# Patient Record
Sex: FEMALE | Race: WHITE | Employment: OTHER | URBAN - METROPOLITAN AREA
[De-identification: names, ages, dates, MRNs, and addresses within clinical notes are randomized per-mention and may not be internally consistent; named-entity substitution may affect disease eponyms.]

---

## 2017-01-16 ENCOUNTER — MYC MEDICAL ADVICE (OUTPATIENT)
Dept: FAMILY MEDICINE | Facility: CLINIC | Age: 57
End: 2017-01-16

## 2017-01-16 DIAGNOSIS — N95.0 POST-MENOPAUSE BLEEDING: Primary | ICD-10-CM

## 2017-01-16 NOTE — TELEPHONE ENCOUNTER
Information below is reviewed with  Dr Bessie Aponte, Verbal Orders, patient will need to have pelvic ultrasound to assess uterine lining and a endometrial biopsy, which Dr Bessie Aponte can do.  embx will need to be done after ultrasound.  Orders are placed. Left message on answering machine for patient/parent to call back.   [phone].  Jesenia Goldstein RN

## 2017-01-16 NOTE — TELEPHONE ENCOUNTER
Patient is given Verbal Orders below.  Patient states her Oncologist wanted her to only see a GYN, ?'d does she need a referral.  Patient is advise to contact her insurance company to determine if she needs a referral from Dr Bessie Aponte or Oncologist.  Who sets this up?  Advise if GYN through [place] via Oncologist they would set up.  Patient states she may want to do this through Dr Bessie Aponte.  Patient is going to contact insurance.  Contact Please call [facility] at [phone] to schedule an appointment and consider options and let care team know plan.    FYI and Per protocol, will route encounter to be cosigned by provider for Verbal Orders.  Jesenia Goldstein RN

## 2017-01-27 DIAGNOSIS — I10 HYPERTENSION GOAL BP (BLOOD PRESSURE) < 140/90: Primary | ICD-10-CM

## 2017-01-27 RX ORDER — LISINOPRIL 20 MG/1
20 TABLET ORAL DAILY
Qty: 90 TABLET | Refills: 2 | Status: SHIPPED | OUTPATIENT
Start: 2017-01-27 | End: 2017-11-04

## 2017-01-27 RX ORDER — HYDROCHLOROTHIAZIDE 25 MG/1
25 TABLET ORAL DAILY
Qty: 90 TABLET | Refills: 2 | Status: SHIPPED | OUTPATIENT
Start: 2017-01-27 | End: 2017-11-04

## 2017-01-27 NOTE — PROGRESS NOTES
SUBJECTIVE:                                                    Nathalie Melchor is a 56 year old female who presents to clinic today for the following health issues:    Abnormal menses recently had medication change to Tamoxifen , has had some spotting since medication recent pelvic US showed uterine fibroids as well as thickened endometrial stripe at 1.1 cm.  No cramping.    pt states that she is having clear vaginal discharge, no irritation or itch. Has had this in past without explanation.      Patient Active Problem List   Diagnosis     GERD (gastroesophageal reflux disease)     Iron deficiency anemia     Leiomyoma of uterus     CARDIOVASCULAR SCREENING; LDL GOAL LESS THAN 160     Osteopenia     Hypertension goal BP (blood pressure) < 140/90     Health maintenance examination     Choroidal nevus of left eye     Gastroesophageal reflux disease without esophagitis     Advanced directives, counseling/discussion     Malignant neoplasm of lower-inner quadrant of left female breast (H)     High risk medication use     Past Surgical History   Procedure Laterality Date     Endoscopy  04/03/2007     upper GI endoscopy-[facility]     Cystoscopy  04/08/1993     cystoscopy and urethral dilation     Mastectomy simple bilateral  7/2009     Armando Massectomy, breast cancer       Social History   Substance Use Topics     Smoking status: Never Smoker      Smokeless tobacco: Never Used      Comment: lives in smoke free household.     Alcohol Use: Yes      Comment: 1 or 2 a month     Family History   Problem Relation Age of Onset     Hypertension Mother      CANCER Mother      not melanoma     Allergies Father      HEART DISEASE Father 78     MI     Hypertension Father      Cancer - colorectal Maternal Grandfather 90     DIABETES No family hx of      CEREBROVASCULAR DISEASE No family hx of      Thyroid Disease No family hx of      Glaucoma No family hx of      Macular Degeneration No family hx of      OSTEOPOROSIS  "Mother          Current Outpatient Prescriptions   Medication Sig Dispense Refill     lisinopril (PRINIVIL/ZESTRIL) 20 MG tablet Take 1 tablet (20 mg) by mouth daily 90 tablet 2     hydrochlorothiazide (HYDRODIURIL) 25 MG tablet Take 1 tablet (25 mg) by mouth daily 90 tablet 2     tamoxifen (NOLVADEX) 20 MG tablet 20 mg       calcium carbonate (TUMS) 500 MG chewable tablet Take 2 chew tab by mouth 2 times daily       omeprazole (PRILOSEC) 20 MG capsule Take 1 capsule (20 mg) by mouth daily (Patient taking differently: Take 20 mg by mouth daily PRN) 90 capsule 3     MELATONIN PO        ASPIRIN NOT PRESCRIBED, INTENTIONAL, 1 each continuous prn Antiplatelet medication not prescribed intentionally due to not needed. 0 each 0     ferrous sulfate 325 (65 FE) MG tablet Take by mouth daily (with breakfast)       BP Readings from Last 3 Encounters:   02/01/17 134/84   10/06/16 128/80   05/10/16 129/81    Wt Readings from Last 3 Encounters:   02/01/17 135 lb (61.236 kg)   12/01/16 135 lb (61.236 kg)   10/06/16 134 lb (60.782 kg)                  Labs reviewed in EPIC  Problem list, Medication list, Allergies, and Medical/Social/Surgical histories reviewed in [facility] and updated as appropriate.    ROS:  Constitutional, HEENT, cardiovascular, pulmonary, gi and gu systems are negative, except as otherwise noted.    OBJECTIVE:                                                    /84 mmHg  Pulse 103  Temp(Src) 98  F (36.7  C) (Oral)  Ht 5' 4\" (1.626 m)  Wt 135 lb (61.236 kg)  BMI 23.16 kg/m2  Body mass index is 23.16 kg/(m^2).  GENERAL: healthy, alert and no distress  ABDOMEN: soft, nontender, no hepatosplenomegaly, no masses and bowel sounds normal   (female): normal female external genitalia, normal urethral meatus , vaginal mucosal atrophy and normal cervix, adnexae, and uterus without masses.  MS: no gross musculoskeletal defects noted, no edema  SKIN: no suspicious lesions or rashes  PSYCH: mentation appears normal, " affect normal/bright    Diagnostic Test Results:  Wet prep: neg     ASSESSMENT/PLAN:                                                    (N95.0) Postmenopausal bleeding  (primary encounter diagnosis)  Comment: postmenopausal bleeding in pt on tamoxifen  Plan: Surgical pathology exam        The procedure technique was explained to the patient,as well as possible risks of infection or bleeding. Patient gives her written consent to proceed.          Speculum was placed into the vaginal vault: Cervix was visualized and appears multiparous.  Cervix was prepped with betadine. A pipelle was easily introduced through the cervical os.  The uterus sounded to 6.5 cm.  Tenaculum was  used on the cervix. Endometrial sample was obtained.  There was very minimal bleeding from the cervical os after removal of the pipelle. Silver nitrate was not used to cauterize the tenaculum sites.  The patient tolerated the procedure well.      (N89.8) Vaginal discharge  Comment: wet prep neg  Plan: Wet prep            (Z01.411) Encounter for gynecological examination with abnormal finding  Comment: due within 6 months  Plan: Pap imaged thin layer screen with HPV -         recommended age 30 - 65 years (select HPV order        below), HPV High Risk Types DNA Cervical              See Patient Instructions    Bessie Aponte MD  [facility]

## 2017-01-30 ENCOUNTER — RADIANT APPOINTMENT (OUTPATIENT)
Dept: ULTRASOUND IMAGING | Facility: CLINIC | Age: 57
End: 2017-01-30
Attending: FAMILY MEDICINE
Payer: COMMERCIAL

## 2017-01-30 DIAGNOSIS — N95.0 POST-MENOPAUSE BLEEDING: ICD-10-CM

## 2017-01-30 PROCEDURE — 76856 US EXAM PELVIC COMPLETE: CPT

## 2017-01-30 PROCEDURE — 76830 TRANSVAGINAL US NON-OB: CPT

## 2017-02-01 ENCOUNTER — OFFICE VISIT (OUTPATIENT)
Dept: FAMILY MEDICINE | Facility: CLINIC | Age: 57
End: 2017-02-01
Payer: COMMERCIAL

## 2017-02-01 VITALS
BODY MASS INDEX: 23.05 KG/M2 | TEMPERATURE: 98 F | DIASTOLIC BLOOD PRESSURE: 84 MMHG | SYSTOLIC BLOOD PRESSURE: 134 MMHG | WEIGHT: 135 LBS | HEART RATE: 103 BPM | HEIGHT: 64 IN

## 2017-02-01 DIAGNOSIS — N95.0 POSTMENOPAUSAL BLEEDING: Primary | ICD-10-CM

## 2017-02-01 DIAGNOSIS — Z01.411 ENCOUNTER FOR GYNECOLOGICAL EXAMINATION WITH ABNORMAL FINDING: ICD-10-CM

## 2017-02-01 DIAGNOSIS — N89.8 VAGINAL DISCHARGE: ICD-10-CM

## 2017-02-01 LAB
MICRO REPORT STATUS: NORMAL
SPECIMEN SOURCE: NORMAL
WET PREP SPEC: NORMAL

## 2017-02-01 PROCEDURE — 87624 HPV HI-RISK TYP POOLED RSLT: CPT | Performed by: FAMILY MEDICINE

## 2017-02-01 PROCEDURE — 99213 OFFICE O/P EST LOW 20 MIN: CPT | Mod: 25 | Performed by: FAMILY MEDICINE

## 2017-02-01 PROCEDURE — 88305 TISSUE EXAM BY PATHOLOGIST: CPT | Performed by: FAMILY MEDICINE

## 2017-02-01 PROCEDURE — G0145 SCR C/V CYTO,THINLAYER,RESCR: HCPCS | Performed by: FAMILY MEDICINE

## 2017-02-01 PROCEDURE — 87210 SMEAR WET MOUNT SALINE/INK: CPT | Performed by: FAMILY MEDICINE

## 2017-02-01 PROCEDURE — 58100 BIOPSY OF UTERUS LINING: CPT | Performed by: FAMILY MEDICINE

## 2017-02-01 NOTE — MR AVS SNAPSHOT
"              After Visit Summary   2/1/2017    Nathalie Melchor    MRN: [id number]           Patient Information     Date Of Birth          1960        Visit Information        Provider Department      2/1/2017 9:55 AM Bessie Aponte MD [facility]        Care Instructions      Monitor for symptoms of infection to include a foul smelling discharge, abdominal tenderness or fever without other explanation.  Monitor for heavy bleeding (soaking a maxi pad every hour for 2-3 hours).          Follow-ups after your visit        Who to contact     If you have questions or need follow up information about today's clinic visit or your schedule please contact [facility] directly at [phone].  Normal or non-critical lab and imaging results will be communicated to you by MyChart, letter or phone within 4 business days after the clinic has received the results. If you do not hear from us within 7 days, please contact the clinic through [employer] or phone. If you have a critical or abnormal lab result, we will notify you by phone as soon as possible.  Submit refill requests through [employer] or call your pharmacy and they will forward the refill request to us. Please allow 3 business days for your refill to be completed.          Additional Information About Your Visit        MyChart Information     [employer] gives you secure access to your electronic health record. If you see a primary care provider, you can also send messages to your care team and make appointments. If you have questions, please call your primary care clinic.  If you do not have a primary care provider, please call [phone] and they will assist you.        Care EveryWhere ID     This is your Care EveryWhere ID. This could be used by other organizations to access your [place] medical records  [id number]        Your Vitals Were     Pulse Temperature Height BMI (Body Mass Index)          103 98  F (36.7  C) (Oral) 5' 4\" " (1.626 m) 23.16 kg/m2         Blood Pressure from Last 3 Encounters:   02/01/17 134/84   10/06/16 128/80   05/10/16 129/81    Weight from Last 3 Encounters:   02/01/17 135 lb (61.236 kg)   12/01/16 135 lb (61.236 kg)   10/06/16 134 lb (60.782 kg)              Today, you had the following     No orders found for display         Today's Medication Changes          These changes are accurate as of: 2/1/17 10:53 AM.  If you have any questions, ask your nurse or doctor.               These medicines have changed or have updated prescriptions.        Dose/Directions    omeprazole 20 MG CR capsule   Commonly known as:  priLOSEC   This may have changed:  additional instructions   Used for:  Gastroesophageal reflux disease without esophagitis        Dose:  20 mg   Take 1 capsule (20 mg) by mouth daily   Quantity:  90 capsule   Refills:  3                Primary Care Provider Office Phone # Fax #    Bessie Aponte [phone]       [facility] [address] [facility] [address]        Thank you!     Thank you for choosing [facility]  for your care. Our goal is always to provide you with excellent care. Hearing back from our patients is one way we can continue to improve our services. Please take a few minutes to complete the written survey that you may receive in the mail after your visit with us. Thank you!             Your Updated Medication List - Protect others around you: Learn how to safely use, store and throw away your medicines at www.disposemymeds.org.          This list is accurate as of: 2/1/17 10:53 AM.  Always use your most recent med list.                   Brand Name Dispense Instructions for use    ASPIRIN NOT PRESCRIBED    INTENTIONAL    0 each    1 each continuous prn Antiplatelet medication not prescribed intentionally due to not needed.       calcium carbonate 500 MG chewable tablet    TUMS     Take 2 chew tab by mouth 2 times daily       ferrous  sulfate 325 (65 FE) MG tablet    IRON     Take by mouth daily (with breakfast)       hydrochlorothiazide 25 MG tablet    HYDRODIURIL    90 tablet    Take 1 tablet (25 mg) by mouth daily       lisinopril 20 MG tablet    PRINIVIL/ZESTRIL    90 tablet    Take 1 tablet (20 mg) by mouth daily       MELATONIN PO          omeprazole 20 MG CR capsule    priLOSEC    90 capsule    Take 1 capsule (20 mg) by mouth daily       tamoxifen 20 MG tablet    NOLVADEX     20 mg

## 2017-02-01 NOTE — PATIENT INSTRUCTIONS
Monitor for symptoms of infection to include a foul smelling discharge, abdominal tenderness or fever without other explanation.  Monitor for heavy bleeding (soaking a maxi pad every hour for 2-3 hours).

## 2017-02-01 NOTE — Clinical Note
"                                                       [facility]  [address] [facility] [id number] [phone]  Affirmation of Informed Consent for Surgery or Invasive Procedure    1.  I, (print patient's name) Nathalie Melchor, RIKKI 1960,   a.  Agree that I will have (include both the medical term and patient words):           Chief Complaint   Patient presents with     Abnormal Bleeding Problem      b.  At [facility].     c.  The reason for this procedure is (medical condition):  Diagnostic endometrial biospy.   d.  This will be done or supervised by:  Bessie Aponte MD.   e.  My doctor may have help from others.  Help could include opening or closing         the wound. Help might also include taking grafts, cutting out tissue,                           implanting devices.  I have been told who will help, if known.     2.  I have talked to my doctor or health care team about:   a.  What the procedure is and what will happen.   b   How it may help me (the benefits).   c.  How it may harm me (the most likely and most serious risks).   d.  The long-term effects the procedure might have.    e.  My other choices for treatment.  The risks and benefits of those choices.    f.   What will likely happen if I say \"no\" to this procedure.    g.  How I might feel right after and how quickly I might be expected to recover.      h.  What medicines will be used to manage pain or sedate me.     3.  I agree that:  (If I do not agree with a statement, I have crossed it out and              initialed next to it.)       a.  I will ask questions.     b.  No one has promised me definite results.    c.  If serious problems are found during the procedure, the treatment may                    change.   d.  If I have \"do not resuscitate\" (DNR) wishes, I have discussed this with my                              doctor and they will be put on hold during the procedure.   e. Students and other " appropriate people, approved by the facility, may watch                      the procedure and help with tasks they are qualified for.                                                    f.  Pictures or videos may be taken. They may be used for medical or                  educational reasons only.       g. Tissues or organs removed from my body as part of the normal course of the                    procedure may be used for research or teaching purposes. They will be                  disposed of with respect.                    h.  If a staff person is exposed to my blood or body fluids, my blood will be drawn                   and tested for HIV and hepatitis.  I understand that by law, the test results will                    go:         -  In my medical record.                         -  To the Employee Occupational Health Service and/or Infection Control                                  at this facility.    -  To the [place] [facility] officials.    i.  I may have a blood transfusion: I have talked to my doctor or care team about:    -  Why I may need a blood transfusion.     - The risks, benefits, and side effects of transfusion - and the risks of not        Having one.     -  Blood safety and other options for treatment.        Consent for blood transfusion obtained during a hospital admission is valid for the entire hospital stay.  Consent  for blood transfusion obtained in the clinic setting is valid for a year from the signature date.                                4.  I understand that:   a.  I can change my mind.  If I do, I must tell my doctor or team as soon as                           possible.              b.  The team members may change during the procedure.                c.   The team will double-check who I am.  They will ask me what I am having                         done and the site of the site of the procedure.  This is done for my safety.    My questions have been answered.  I agree to the  procedure.  I have made my special needs and instructions known.      Nathalie Melchor      2/1/2017 10:16 AM  Patient (or representative)/Relationship to patient             Date  Time    For telephone consent:      2/1/2017  10:16 AM         Date  Time  Print name of patient (or representative)/relationship to patient    Witness:  I have verified that the signature is that of the patient or patient's representative and that this has been signed before the procedure:    NAME:        2/1/2017  10:16 AM         Date  Time  Person verifying patient's name or patient representative's signature     Provider:  I have discussed the procedure and the information stated above with the patient (or the patient's representative) and answered their questions. The patient or their representative consented to the procedure:      Bessie Aponte MD    2/1/2017  10:16 AM  Physician or Provider's Signature(s)   Date  Time       Intepreter (if used):       2/1/2017  10:16 AM                                   Name       Language/Organization Date  Time    Consent for procedure valid for 30 days after patient signature date     [facility]  AFFIRMATION OF INFORMED CONSENT FOR SURGERY OR INVASIVE PROCEDURE               Original - Medical Records

## 2017-02-01 NOTE — NURSING NOTE
"Chief Complaint   Patient presents with     Abnormal Bleeding Problem       Initial /80 mmHg  Pulse 103  Temp(Src) 98  F (36.7  C) (Oral)  Ht 5' 4\" (1.626 m)  Wt 135 lb (61.236 kg)  BMI 23.16 kg/m2 Estimated body mass index is 23.16 kg/(m^2) as calculated from the following:    Height as of this encounter: 5' 4\" (1.626 m).    Weight as of this encounter: 135 lb (61.236 kg).  BP completed using cuff size: manuel Ndiaye CMA      "

## 2017-02-03 LAB
COPATH REPORT: NORMAL
COPATH REPORT: NORMAL
PAP: NORMAL

## 2017-02-06 LAB
FINAL DIAGNOSIS: NORMAL
HPV HR 12 DNA CVX QL NAA+PROBE: NEGATIVE
HPV16 DNA SPEC QL NAA+PROBE: NEGATIVE
HPV18 DNA SPEC QL NAA+PROBE: NEGATIVE
SPECIMEN DESCRIPTION: NORMAL

## 2017-02-08 ENCOUNTER — TELEPHONE (OUTPATIENT)
Dept: FAMILY MEDICINE | Facility: CLINIC | Age: 57
End: 2017-02-08

## 2017-02-08 NOTE — TELEPHONE ENCOUNTER
Please notify pt that her biopsy did not have enough tissue to make a definitive diagnosis,  I recommend a consult with gynecology.  I have submitted a consult for you.  Please call [phone] to make an appointment.  I would like you seen within 4-6 weeks.

## 2017-02-08 NOTE — LETTER
[facility]  [address] [facility] [address] [phone]             February 14, 2017    Nathalie Melchor  [address] [facility] [address]              Dear Nathalie,    Your biopsy did not have enough tissue to make a definitive diagnosis, I recommend a consult with gynecology.  I have submitted a consult for you. Please call [phone] to make an appointment. I would like you seen within 4-6 weeks.    Sincerely,     Bessie Aponte MD

## 2017-02-08 NOTE — TELEPHONE ENCOUNTER
Left message on answering machine for patient/parent to call back.   [phone].  Jesenia Goldstein RN

## 2017-02-09 NOTE — TELEPHONE ENCOUNTER
Left message on answering machine for patient/parent to call back.   [phone].  Jesenia Goldstein RN

## 2017-02-13 NOTE — TELEPHONE ENCOUNTER
Left message on answering machine for patient/parent to call back.   [phone].  Jesenia Goldstein RN

## 2017-02-14 NOTE — TELEPHONE ENCOUNTER
No return call from patient after 3 attempts to reach her by phone.  Routing to PCP to see if certified letter should be sent.     Krys Neumann RN

## 2017-02-17 ENCOUNTER — TELEPHONE (OUTPATIENT)
Dept: FAMILY MEDICINE | Facility: CLINIC | Age: 57
End: 2017-02-17

## 2017-02-17 NOTE — TELEPHONE ENCOUNTER
See 2/8/17 telephone encounter with provider message related to pt inquiry below. This information was also put into a letter on 2/14/17 and mailed to pt, which may not have been received yet.  Fiorella Garza RN

## 2017-02-17 NOTE — TELEPHONE ENCOUNTER
Patient is calling stating provider was suppose to put in a referral for her to see an OB for a biopsy, would like a number to call to make appt. Please call to discuss.Thank you.

## 2017-02-20 ENCOUNTER — TELEPHONE (OUTPATIENT)
Dept: OBGYN | Facility: CLINIC | Age: 57
End: 2017-02-20

## 2017-02-20 NOTE — TELEPHONE ENCOUNTER
Reason for Call:  Other appointment    Detailed comments: Patient states Dr.Jill Aponte has referred her to OB/GYN for Endometrial Biopsy . Please call patient at [phone] to schedule.     Phone Number Patient can be reached at: Home number on file [phone] (home)    Best Time: any    Can we leave a detailed message on this number? NO    Call taken on 2/20/2017 at 10:44 AM by Andree Huynh

## 2017-02-20 NOTE — TELEPHONE ENCOUNTER
Patient/parent is informed of MD note below, as it is written. Verbalized good understanding.  Jesenia Goldstein RN

## 2017-02-22 ENCOUNTER — TELEPHONE (OUTPATIENT)
Dept: OBGYN | Facility: CLINIC | Age: 57
End: 2017-02-22

## 2017-02-22 ENCOUNTER — OFFICE VISIT (OUTPATIENT)
Dept: OBGYN | Facility: CLINIC | Age: 57
End: 2017-02-22
Payer: COMMERCIAL

## 2017-02-22 VITALS
BODY MASS INDEX: 24.03 KG/M2 | OXYGEN SATURATION: 98 % | DIASTOLIC BLOOD PRESSURE: 81 MMHG | HEART RATE: 97 BPM | WEIGHT: 140 LBS | SYSTOLIC BLOOD PRESSURE: 125 MMHG

## 2017-02-22 DIAGNOSIS — N95.0 POSTMENOPAUSAL BLEEDING: Primary | ICD-10-CM

## 2017-02-22 DIAGNOSIS — R93.89 ENDOMETRIAL THICKENING ON ULTRA SOUND: ICD-10-CM

## 2017-02-22 DIAGNOSIS — Z85.3 PERSONAL HISTORY OF MALIGNANT NEOPLASM OF BREAST: ICD-10-CM

## 2017-02-22 PROCEDURE — 99244 OFF/OP CNSLTJ NEW/EST MOD 40: CPT | Performed by: OBSTETRICS & GYNECOLOGY

## 2017-02-22 NOTE — MR AVS SNAPSHOT
After Visit Summary   2/22/2017    Nathalie Melchor    MRN: [id number]           Patient Information     Date Of Birth          1960        Visit Information        Provider Department      2/22/2017 2:15 PM [facility]        Care Instructions    If you have any questions regarding your visit, Please contact your care team.   Women s Health  CLINIC HOURS  TELEPHONE NUMBER    FACUNDO Brand-   Sara Ayala-Medical Assistant  Tuesday-[place]  7:30 a.m-3:30 p.m   Wednesday-[place]  10:00 a.m-4:30 p.m.   Thursday-[place] 8:00 a.m-4:30 p.m.   Friday-[place]  8:00a.m-1:00 p.m.  [facility]   [address] [place] [address]   [phone] ask for [facility]   Imaging [phone]     [facility] Labor and Delivery   [address] [place] [address] [place] [address]   [phone]     [facility]  [address] [place] [address]   [phone] ask for [facility]   Imaging [phone]      Urgent Care locations:   [facility]  Monday-Friday   5 pm - 9 pm   Saturday and Sunday   9 am - 5 pm   Monday-Friday   11 am - 9 pm   Saturday and Sunday   9 am - 5 pm  [phone]    If you need a medication refill, please contact your pharmacy. Please allow 3 business days for your refill to be completed.  As always, Thank you for trusting us with your healthcare needs!            Follow-ups after your visit        Your next 10 appointments already scheduled     Mar 29, 2017  3:45 PM CDT   Post Op with Piotr Barbour MD   [place] [place] ([facility])    [address] [place] [id number] [phone]              Who to contact     If you have questions or need follow up information about today's clinic visit or your schedule please contact [facility] directly at  [phone].  Normal or non-critical lab and imaging results will be communicated to you by [employer], letter or phone within 4 business days after the clinic has received the results. If you do not hear from us within 7 days, please contact the clinic through [employer] or phone. If you have a critical or abnormal lab result, we will notify you by phone as soon as possible.  Submit refill requests through [employer] or call your pharmacy and they will forward the refill request to us. Please allow 3 business days for your refill to be completed.          Additional Information About Your Visit        [employer] Information     [employer] gives you secure access to your electronic health record. If you see a primary care provider, you can also send messages to your care team and make appointments. If you have questions, please call your primary care clinic.  If you do not have a primary care provider, please call [phone] and they will assist you.        Care EveryWhere ID     This is your Care EveryWhere ID. This could be used by other organizations to access your [place] medical records  [id number]        Your Vitals Were     Pulse Pulse Oximetry Breastfeeding? BMI (Body Mass Index)          97 98% No 24.03 kg/m2         Blood Pressure from Last 3 Encounters:   02/22/17 125/81   02/01/17 134/84   10/06/16 128/80    Weight from Last 3 Encounters:   02/22/17 140 lb (63.5 kg)   02/01/17 135 lb (61.2 kg)   12/01/16 135 lb (61.2 kg)              Today, you had the following     No orders found for display         Today's Medication Changes          These changes are accurate as of: 2/22/17  3:15 PM.  If you have any questions, ask your nurse or doctor.               These medicines have changed or have updated prescriptions.        Dose/Directions    omeprazole 20 MG CR capsule   Commonly known as:  priLOSEC   This may have changed:  additional instructions   Used for:  Gastroesophageal reflux disease without esophagitis         Dose:  20 mg   Take 1 capsule (20 mg) by mouth daily   Quantity:  90 capsule   Refills:  3                Primary Care Provider Office Phone # Fax #    Bessie Aponte [phone]       [facility] [address] [address] [facility] [address]        Thank you!     Thank you for choosing [facility]  for your care. Our goal is always to provide you with excellent care. Hearing back from our patients is one way we can continue to improve our services. Please take a few minutes to complete the written survey that you may receive in the mail after your visit with us. Thank you!             Your Updated Medication List - Protect others around you: Learn how to safely use, store and throw away your medicines at www.disposemymeds.org.          This list is accurate as of: 2/22/17  3:15 PM.  Always use your most recent med list.                   Brand Name Dispense Instructions for use    ASPIRIN NOT PRESCRIBED    INTENTIONAL    0 each    1 each continuous prn Antiplatelet medication not prescribed intentionally due to not needed.       calcium carbonate 500 MG chewable tablet    TUMS     Take 2 chew tab by mouth 2 times daily       ferrous sulfate 325 (65 FE) MG tablet    IRON     Take by mouth daily (with breakfast)       hydrochlorothiazide 25 MG tablet    HYDRODIURIL    90 tablet    Take 1 tablet (25 mg) by mouth daily       lisinopril 20 MG tablet    PRINIVIL/ZESTRIL    90 tablet    Take 1 tablet (20 mg) by mouth daily       MELATONIN PO          omeprazole 20 MG CR capsule    priLOSEC    90 capsule    Take 1 capsule (20 mg) by mouth daily       tamoxifen 20 MG tablet    NOLVADEX     20 mg

## 2017-02-22 NOTE — NURSING NOTE
"Chief Complaint   Patient presents with     Consult     US done 1/30/17 . Possible EMB per Dr. Aponte       Initial /81 (BP Location: Right arm, Cuff Size: Adult Regular)  Pulse 97  Wt 140 lb (63.5 kg)  SpO2 98%  Breastfeeding? No  BMI 24.03 kg/m2 Estimated body mass index is 24.03 kg/(m^2) as calculated from the following:    Height as of 2/1/17: 5' 4\" (1.626 m).    Weight as of this encounter: 140 lb (63.5 kg).  Medication Reconciliation: complete   MELISSA Marina 2/22/2017         "

## 2017-02-22 NOTE — PATIENT INSTRUCTIONS
If you have any questions regarding your visit, Please contact your care team.   Women s Health  CLINIC HOURS  TELEPHONE NUMBER    FACUNDO Brand-   Sara Ayala-Medical Assistant  Tuesday-[place]  7:30 a.m-3:30 p.m   Wednesday-[place]  10:00 a.m-4:30 p.m.   Thursday-[place] 8:00 a.m-4:30 p.m.   Friday-[place]  8:00a.m-1:00 p.m.  [facility]   [address] [address] [place] [address].   [place] [address]   [phone] ask for [facility]   Imaging [phone]     [facility] Labor and Delivery   [address] [place] [address] [place] [address]   [phone]     [facility]  [address] [facility] [place] [address]   [phone] ask for [facility]   Imaging [phone]      Urgent Care locations:   [facility]  Monday-Friday   5 pm - 9 pm   Saturday and Sunday   9 am - 5 pm   Monday-Friday   11 am - 9 pm   Saturday and Sunday   9 am - 5 pm  [phone]    If you need a medication refill, please contact your pharmacy. Please allow 3 business days for your refill to be completed.  As always, Thank you for trusting us with your healthcare needs!

## 2017-02-22 NOTE — TELEPHONE ENCOUNTER
Surgery Scheduled.    Date of Surgery 3/16/17 Time of Surgery 2:30 pm  Procedure: Hysteroscopy/ D&C  Hospital/Surgical Facility: [facility]  Surgeon: Dr. Barbour  Type of Anesthesia Anticipated: Local with MAC  Pre-op: To be scheduled  2 week post op: To be scheduled  Pre-certification 2/22/17  Consent signed: NA  Hospital Stay NA       [facility] surgery packet given to patient at OV on 2/22/17.  Patient instructed NPO 12 hours prior to surgery, arrive 1 1/2 hours prior to surgery, must have a .  Patient understood and agrees to the plan.      Surgery Pre-Certification     Medical Record Number: [id number]   Nathalie Melchor   YOB: 1960   Phone: [phone] (home)   Primary Provider: Bessie Aponte     Reason for Admit:   Post Menopausal Bleeding/ Thickened Endometrium     Surgeon: JOYCE Barbour MD   Surgical Procedure: Hysteroscopy/ D&C   ICD-9 Coded: N95.0/ R93.8   Date of Surgery: 3/16/17   Consent signed? N/A     Hospital: [facility]   Outpatient     Requestor:   Bryanna Stafford     Location:   [facility]   Paula Fowler CMA

## 2017-03-01 NOTE — PROGRESS NOTES
Nathalie is a 56 year old  female .  I have been asked to see patient in consultation by Bessie Harry MD, regarding postmenopausal bleeding, with Tamoxifen use.  She has a personal history of breast cancer with mastectomy and chemotherapy use.  Dr. Reynaga has been following her and she had used Arimidex which was recently changed to Tamoxifen.  She had 4 days fo spotting in .  Since then, she has continued to have a drop of blood and some light blood with wiping with voiding.  An EMB was performed, but the sample did not seem to be adequate enough for further evaluation.      We reviewed the endometrial biopsy results that follow:    Collected: 2017   Received: 2017   Reported: 2/3/2017 07:16   Ordering Phy(s): BESSIE HARRY     For improved result formatting, select 'View Enhanced Report Format'   under Linked Documents section.     SPECIMEN(S):   Endometrial biopsy     FINAL DIAGNOSIS:   Uterus, endometrial biopsy:   - Scanty and small fragments of inactive/atrophic endometrium (see comment).     COMMENT:   The specimen consists mostly of blood.  Scanty and small fragments of  cervical glandular mucosa and lower uterine segment/endometrium are seen.  Although this may be seen in specimens from atrophic endometrium.   the specimen may not be representative especially if there is endometrial thickening by imaging.  Although there is no evidence of hyperplasia in the planes examined, there are no good-sized tissue  fragments with intact glands and stroma to adequately assess for  hyperplasia. There is no evidence of atypia or malignancy.  Should the  bleeding persist and the clinical inquiry remains repeat biopsy is recommended         She had a pelvic ultrasound and the patient and I reviewed the report and we also reviewed the films.      PELVIC ULTRASOUND 2017 4:14 PM  HISTORY: Postmenopausal bleeding.  COMPARISON: None.  TECHNIQUE: Transabdominal and transvaginal imaging were  obtained.  Transvaginal imaging was obtained to better evaluate the uterus and adnexa. Doppler waveform analysis performed.  FINDINGS: Uterus is enlarged measuring 10.6 x 7.0 x 7.9 cm. The endometrial canal measures 1.1 cm in thickness and lies in the midline. This is thickened for a patient of this age. Small amount of fluid in the endometrial canal. Multiple fibroids within the uterus as follows:  1. 4.8 x 3.8 x 3.6 cm fibroid on the right.  2. 4.1 x 3.1 x 2.9 cm fibroid on the left.  3. 1.3 x 1.0 x 1.4 cm in the upper fundus on the left.  Neither ovary is identified. No adnexal mass or fluid in the cul-de-sac. Remainder of the scan is unremarkable.  IMPRESSION:   1. Thickened endometrium with fluid in the endometrial canal.  2. Multiple uterine fibroids as described above.  3. Neither ovary is identified.  4. Remainder of the scan is unremarkable.        Past Medical History   Diagnosis Date     Allergic rhinitis      Breast cancer (H)      armando masectomy     GERD (gastroesophageal reflux disease)      HCD (health care directive)      Hypertension      Iron deficiency anemia      Leiomyoma of uterus, unspecified      Lump or mass in breast        Past Surgical History   Procedure Laterality Date     Endoscopy  2007     upper GI endoscopy-[facility]     Cystoscopy  1993     cystoscopy and urethral dilation     Mastectomy simple bilateral  2009     Armando Massectomy, breast cancer       Obstetric History       T2      TAB0   SAB0   E0   M0   L2       # Outcome Date GA Lbr Edwin/2nd Weight Sex Delivery Anes PTL Lv   2 Term            1 Term                   Gynecological History         No LMP recorded. Patient is postmenopausal.     no STD/no PID/no IUD   no abnormal pap smear   last pap 2017   Pap NIL/ high risk HPV negative      see above HPI        Allergies   Allergen Reactions     Caffeine      Cats      Augmentin Diarrhea       Current Outpatient  Prescriptions   Medication Sig Dispense Refill     lisinopril (PRINIVIL/ZESTRIL) 20 MG tablet Take 1 tablet (20 mg) by mouth daily 90 tablet 2     hydrochlorothiazide (HYDRODIURIL) 25 MG tablet Take 1 tablet (25 mg) by mouth daily 90 tablet 2     tamoxifen (NOLVADEX) 20 MG tablet 20 mg       calcium carbonate (TUMS) 500 MG chewable tablet Take 2 chew tab by mouth 2 times daily       omeprazole (PRILOSEC) 20 MG capsule Take 1 capsule (20 mg) by mouth daily (Patient taking differently: Take 20 mg by mouth daily PRN) 90 capsule 3     MELATONIN PO        ASPIRIN NOT PRESCRIBED, INTENTIONAL, 1 each continuous prn Antiplatelet medication not prescribed intentionally due to not needed. 0 each 0     ferrous sulfate 325 (65 FE) MG tablet Take by mouth daily (with breakfast)         Social History     Social History     Marital status:      Spouse name: N/A     Number of children: 2     Years of education: N/A     Occupational History     Not on file.     Social History Main Topics     Smoking status: Never Smoker     Smokeless tobacco: Never Used      Comment: lives in smoke free household.     Alcohol use Yes      Comment: 1 or 2 a month     Drug use: No     Sexual activity: Yes     Partners: Male     Birth control/ protection: Post-menopausal     Other Topics Concern     Parent/Sibling W/ Cabg, Mi Or Angioplasty Before 65f 55m? No      Service No     Blood Transfusions No     Caffeine Concern No     Occupational Exposure No     Hobby Hazards No     Sleep Concern No     Stress Concern No     Weight Concern No     Special Diet No     Back Care No     Exercise No     Bike Helmet No     Seat Belt No     Self-Exams No     Social History Narrative       Family History   Problem Relation Age of Onset     Hypertension Mother      CANCER Mother      not melanoma     Allergies Father      HEART DISEASE Father 78     MI     Hypertension Father      Cancer - colorectal Maternal Grandfather 90     DIABETES No family hx  of      CEREBROVASCULAR DISEASE No family hx of      Thyroid Disease No family hx of      Glaucoma No family hx of      Macular Degeneration No family hx of      OSTEOPOROSIS Mother        Review of Systems:  10 point ROS of systems including Constitutional, Eyes, Respiratory, Cardiovascular, Gastroenterology, Genitourinary, Integumentary, Muscularskeletal, Psychiatric were all negative except for pertinent positives noted in my HPI and in the PMH.      EXAM:  /81 (BP Location: Right arm, Cuff Size: Adult Regular)  Pulse 97  Wt 140 lb (63.5 kg)  SpO2 98%  Breastfeeding? No  BMI 24.03 kg/m2  Body mass index is 24.03 kg/(m^2).  General:  WNWD female, NAD  Alert  Oriented x 3  Gait:  Normal  Skin:  Normal skin turgor  HEENT:  NC/AT, EOMI  Abdomen:  Non-tender, non-distended.  Pelvic exam:  Not performed  Extremities:  No clubbing, cyanosis or edema.       ASSESSMENT:  Postmenopausal bleeding  Endometrial thickening on ultrasound   Personal history of breast cancer.       PLAN:  The pathology with the biopsy seems to be reassuring.  However, the thickening noted with the ultrasound does not correspond well with the biopsy results.  However, I have seen similar findings in other individuals who have been on similar medications.  I reviewed the options with her.  We discussed the repeat endometrial biopsy and we also reviewed the Hysteroscopy and the dilation and curettage.  The goals and limitations were reviewed with her and questions seemed to be answered.  She is agreeable to having the D&C with hysteroscopy.    We reviewed the associated risks and benefits and the goals and limitations.  The risks include, but are not limited to, death, brain damage, paralysis of any or all limbs, loss of an organ, function of an organ, disfiguring scars, bleeding, infection, damage to the uterus, tubes, ovaries, bowel, bladder, cervix and vagina.  In addition, there is a possibility of other procedures that might be deemed  necessary at the time of the surgery, depending upon findings and/or complications.  This may also include laparoscopy, laparotomy, and rarely colostomy (which often times can be reversible in the future).  Risks with blood include but are not limited to HIV/AIDS, hepatitis and transfusion reactions, with transfusion reactions being the greatest risk.  Questions seemed to be answered.   She will have the preop with Dr. Aponte.    TT 40-45 min  CT and review of records, greater than 50%    Piotr Barbour MD

## 2017-03-07 NOTE — PROGRESS NOTES
[facility]  [address] [facility] [address] [phone]  Dept: [phone]    PRE-OP EVALUATION:  Today's date: 3/8/2017    Nathalie Melchor (: 1960) presents for pre-operative evaluation assessment as requested by Dr. Barbour.  She requires evaluation and anesthesia risk assessment prior to undergoing surgery/procedure for treatment of postmenopausal bleeding .  Proposed procedure: D&C with hysteroscopy    Date of Surgery/ Procedure: 3/16/17  Time of Surgery/ Procedure: PM   Hospital/Surgical Facility: [facility]   Fax number for surgical facility:   Primary Physician: Bessie Aponte  Type of Anesthesia Anticipated: to be determined    Patient has a Health Care Directive or Living Will:  NO    1. NO - Do you have a history of heart attack, stroke, stent, bypass or surgery on an artery in the head, neck, heart or legs?  2. NO - Do you ever have any pain or discomfort in your chest?  3. NO - Do you have a history of  Heart Failure?  4. NO - Are you troubled by shortness of breath when: walking on the level, up a slight hill or at night?  5. NO - Do you currently have a cold, bronchitis or other respiratory infection?  6. NO - Do you have a cough, shortness of breath or wheezing?  7. NO - Do you sometimes get pains in the calves of your legs when you walk?  8. NO - Do you or anyone in your family have previous history of blood clots?  9. NO - Do you or does anyone in your family have a serious bleeding problem such as prolonged bleeding following surgeries or cuts?  10. YES - HAVE YOU EVER HAD PROBLEMS WITH ANEMIA OR BEEN TOLD TO TAKE IRON PILLS? Currently taking iron due to anemia  11. NO - Have you had any abnormal blood loss such as black, tarry or bloody stools, or abnormal vaginal bleeding?  12. NO - Have you ever had a blood transfusion?  13. YES - HAVE YOU OR ANY OF YOUR RELATIVES EVER HAD PROBLEMS WITH ANESTHESIA? Pt with significant nausea after anesthesia  14.  YES - DO YOU HAVE SLEEP APNEA, EXCESSIVE SNORING OR DAYTIME DROWSINESS? snoring  15. NO - Do you have any prosthetic heart valves?  16. NO - Do you have prosthetic joints?  17. NO - Is there any chance that you may be pregnant?      HPI:                                                      Brief HPI related to upcoming procedure: Thickened endometrium in postmenopausal woman, inconclusive endometrial biopsy, proceeding with hysteroscopy and D&C.    Currently on tamoxifen for breast cancer.  Stable no active chemo.    HYPERTENSION - Patient has longstanding history of mod-severe HTN , currently denies any symptoms referable to elevated blood pressure. Specifically denies chest pain, palpitations, dyspnea, orthopnea, PND or peripheral edema. Blood pressure readings have been in normal range. Current medication regimen is as listed below. Patient denies any side effects of medication.                                                                                                                                                                                          .    MEDICAL HISTORY:                                                      Patient Active Problem List    Diagnosis Date Noted     High risk medication use 02/08/2016     Priority: Medium     On arimidex for breast cancer        Advanced directives, counseling/discussion 01/25/2016     Priority: Medium     Discussed Advance Directive planning with patient; information given to patient to review.  Okasna Ndiaye CMA           Malignant neoplasm of lower-inner quadrant of left female breast (H) 01/25/2016     Priority: Medium     Gastroesophageal reflux disease without esophagitis 01/03/2016     Priority: Medium     Choroidal nevus of left eye 03/11/2014     Priority: Medium     Health maintenance examination 08/22/2012     Priority: Medium     Had colonoscopy in 2010 in [place] gastroenterology: normal       Osteopenia 01/17/2011     Priority: Medium      Hypertension goal BP (blood pressure) < 140/90 01/17/2011     Priority: Medium     CARDIOVASCULAR SCREENING; LDL GOAL LESS THAN 160 10/31/2010     Priority: Medium     GERD (gastroesophageal reflux disease)      Priority: Medium     Iron deficiency anemia      Priority: Medium     Leiomyoma of uterus      Priority: Medium     Problem list name updated by automated process. Provider to review        Past Medical History   Diagnosis Date     Allergic rhinitis      Breast cancer (H)      armando masectomy     GERD (gastroesophageal reflux disease)      HCD (health care directive)      Hypertension      Iron deficiency anemia      Leiomyoma of uterus, unspecified      Lump or mass in breast      Past Surgical History   Procedure Laterality Date     Endoscopy  04/03/2007     upper GI endoscopy-[facility]     Cystoscopy  04/08/1993     cystoscopy and urethral dilation     Mastectomy simple bilateral  7/2009     Armando Massectomy, breast cancer     Current Outpatient Prescriptions   Medication Sig Dispense Refill     lisinopril (PRINIVIL/ZESTRIL) 20 MG tablet Take 1 tablet (20 mg) by mouth daily 90 tablet 2     hydrochlorothiazide (HYDRODIURIL) 25 MG tablet Take 1 tablet (25 mg) by mouth daily 90 tablet 2     tamoxifen (NOLVADEX) 20 MG tablet 20 mg       calcium carbonate (TUMS) 500 MG chewable tablet Take 2 chew tab by mouth 2 times daily       omeprazole (PRILOSEC) 20 MG capsule Take 1 capsule (20 mg) by mouth daily (Patient taking differently: Take 20 mg by mouth daily PRN) 90 capsule 3     MELATONIN PO        ASPIRIN NOT PRESCRIBED, INTENTIONAL, 1 each continuous prn Antiplatelet medication not prescribed intentionally due to not needed. 0 each 0     ferrous sulfate 325 (65 FE) MG tablet Take by mouth daily (with breakfast)       OTC products: None, except as noted above    Allergies   Allergen Reactions     Caffeine      Cats      Augmentin Diarrhea      Latex Allergy: NO    Social History   Substance Use  Topics     Smoking status: Never Smoker     Smokeless tobacco: Never Used      Comment: lives in smoke free household.     Alcohol use Yes      Comment: 1 or 2 a month     History   Drug Use No       REVIEW OF SYSTEMS:                                                    C: NEGATIVE for fever, chills, change in weight  E/M: NEGATIVE for ear, mouth and throat problems  R: NEGATIVE for significant cough or SOB  CV: NEGATIVE for chest pain, palpitations or peripheral edema    EXAM:                                                    /79  Pulse 89  Temp 98.9  F (37.2  C) (Oral)  Wt 136 lb 6.4 oz (61.9 kg)  SpO2 98%  BMI 23.41 kg/m2  GENERAL APPEARANCE: healthy, alert and no distress  HENT: ear canals and TM's normal and nose and mouth without ulcers or lesions  RESP: lungs clear to auscultation - no rales, rhonchi or wheezes  CV: regular rate and rhythm, normal S1 S2, no S3 or S4 and no murmur, click or rub   ABDOMEN: soft, nontender, no HSM or masses and bowel sounds normal  NEURO: Normal strength and tone, mentation intact, speech normal and cranial nerves 2-12 intact    DIAGNOSTICS:                                                      EKG: NSR, no previous for comparison, see attached stress echo.  Labs Drawn and in Process:   Unresulted Labs Ordered in the Past 30 Days of this Admission     No orders found from 1/7/2017 to 3/9/2017.          Recent Labs   Lab Test  10/06/16   0925 05/18/16  12/24/15   0758   08/23/12   0942   HGB   --    --   13.5   --   14.7   NA  135   --   142   < >  141   POTASSIUM  3.4  3.6  3.7   < >  3.9   CR  0.69  0.66  0.67   < >  0.73   A1C  5.4   --    --    --    --     < > = values in this interval not displayed.        IMPRESSION:                                                    Reason for surgery/procedure: Thickened endometrium in postmenopausal woman, inconclusive endometrial biopsy, proceeding with hysteroscopy and D&C.    The proposed surgical procedure is considered  INTERMEDIATE risk.    REVISED CARDIAC RISK INDEX  The patient has the following serious cardiovascular risks for perioperative complications such as (MI, PE, VFib and 3  AV Block):  No serious cardiac risks  INTERPRETATION: 0 risks: Class I (very low risk - 0.4% complication rate)    The patient has the following additional risks for perioperative complications:  No identified additional risks      ICD-10-CM    1. Preop general physical exam Z01.818    2. Postmenopausal bleeding N95.0    3. Hypertension goal BP (blood pressure) < 140/90 I10 BASIC METABOLIC PANEL     Lipid panel reflex to direct LDL   4. Malignant neoplasm of lower-inner quadrant of left female breast (H) C50.312        RECOMMENDATIONS:                                                          --Patient is to take all scheduled medications on the day of surgery.    APPROVAL GIVEN to proceed with proposed procedure, without further diagnostic evaluation       Signed Electronically by: Bessie Aponte MD    Copy of this evaluation report is provided to requesting physician.    [facility] Preop Guidelines

## 2017-03-08 ENCOUNTER — OFFICE VISIT (OUTPATIENT)
Dept: FAMILY MEDICINE | Facility: CLINIC | Age: 57
End: 2017-03-08
Payer: COMMERCIAL

## 2017-03-08 VITALS
BODY MASS INDEX: 23.41 KG/M2 | DIASTOLIC BLOOD PRESSURE: 79 MMHG | OXYGEN SATURATION: 98 % | WEIGHT: 136.4 LBS | HEART RATE: 89 BPM | TEMPERATURE: 98.9 F | SYSTOLIC BLOOD PRESSURE: 119 MMHG

## 2017-03-08 DIAGNOSIS — Z01.818 PREOP GENERAL PHYSICAL EXAM: Primary | ICD-10-CM

## 2017-03-08 DIAGNOSIS — I10 HYPERTENSION GOAL BP (BLOOD PRESSURE) < 140/90: ICD-10-CM

## 2017-03-08 DIAGNOSIS — C50.312 MALIGNANT NEOPLASM OF LOWER-INNER QUADRANT OF LEFT FEMALE BREAST (H): ICD-10-CM

## 2017-03-08 DIAGNOSIS — N95.0 POSTMENOPAUSAL BLEEDING: ICD-10-CM

## 2017-03-08 LAB
ANION GAP SERPL CALCULATED.3IONS-SCNC: 10 MMOL/L (ref 3–14)
BUN SERPL-MCNC: 19 MG/DL (ref 7–30)
CALCIUM SERPL-MCNC: 9.5 MG/DL (ref 8.5–10.1)
CHLORIDE SERPL-SCNC: 103 MMOL/L (ref 94–109)
CHOLEST SERPL-MCNC: 218 MG/DL
CO2 SERPL-SCNC: 27 MMOL/L (ref 20–32)
CREAT SERPL-MCNC: 0.61 MG/DL (ref 0.52–1.04)
GFR SERPL CREATININE-BSD FRML MDRD: ABNORMAL ML/MIN/1.7M2
GLUCOSE SERPL-MCNC: 101 MG/DL (ref 70–99)
HDLC SERPL-MCNC: 47 MG/DL
LDLC SERPL CALC-MCNC: 135 MG/DL
NONHDLC SERPL-MCNC: 171 MG/DL
POTASSIUM SERPL-SCNC: 4.2 MMOL/L (ref 3.4–5.3)
SODIUM SERPL-SCNC: 140 MMOL/L (ref 133–144)
TRIGL SERPL-MCNC: 182 MG/DL

## 2017-03-08 PROCEDURE — 80048 BASIC METABOLIC PNL TOTAL CA: CPT | Performed by: FAMILY MEDICINE

## 2017-03-08 PROCEDURE — 99214 OFFICE O/P EST MOD 30 MIN: CPT | Performed by: FAMILY MEDICINE

## 2017-03-08 PROCEDURE — 36415 COLL VENOUS BLD VENIPUNCTURE: CPT | Performed by: FAMILY MEDICINE

## 2017-03-08 PROCEDURE — 80061 LIPID PANEL: CPT | Performed by: FAMILY MEDICINE

## 2017-03-08 NOTE — NURSING NOTE
"Chief Complaint   Patient presents with     Pre-Op Exam       Initial /79  Pulse 89  Temp 98.9  F (37.2  C) (Oral)  Wt 136 lb 6.4 oz (61.9 kg)  SpO2 98%  BMI 23.41 kg/m2 Estimated body mass index is 23.41 kg/(m^2) as calculated from the following:    Height as of 2/1/17: 5' 4\" (1.626 m).    Weight as of this encounter: 136 lb 6.4 oz (61.9 kg).  Medication Reconciliation: complete  Stewart Quintero CMA    "

## 2017-03-08 NOTE — MR AVS SNAPSHOT
After Visit Summary   3/8/2017    Nathalie Melchor    MRN: [id number]           Patient Information     Date Of Birth          1960        Visit Information        Provider Department      3/8/2017 7:35 AM Bessie Aponte MD [facility]        Today's Diagnoses     Preop general physical exam    -  1    Postmenopausal bleeding        Hypertension goal BP (blood pressure) < 140/90        Malignant neoplasm of lower-inner quadrant of left female breast (H)          Care Instructions      Before Your Surgery      Call your surgeon if there is any change in your health. This includes signs of a cold or flu (such as a sore throat, runny nose, cough, rash or fever).    Do not smoke, drink alcohol or take over the counter medicine (unless your surgeon or primary care doctor tells you to) for the 24 hours before and after surgery.    If you take prescribed drugs: Follow your doctor s orders about which medicines to take and which to stop until after surgery.    Eating and drinking prior to surgery: follow the instructions from your surgeon    Take a shower or bath the night before surgery. Use the soap your surgeon gave you to gently clean your skin. If you do not have soap from your surgeon, use your regular soap. Do not shave or scrub the surgery site.  Wear clean pajamas and have clean sheets on your bed.         Follow-ups after your visit        Your next 10 appointments already scheduled     Mar 29, 2017  3:45 PM CDT   Post Op with Piotr Barbour MD   [facility] [place] ([facility])    [address] [place] [id number] [phone]              Who to contact     If you have questions or need follow up information about today's clinic visit or your schedule please contact [facility] directly at [phone].  Normal or non-critical lab and imaging results will be communicated to you by MyChart, letter or phone within 4  business days after the clinic has received the results. If you do not hear from us within 7 days, please contact the clinic through [employer] or phone. If you have a critical or abnormal lab result, we will notify you by phone as soon as possible.  Submit refill requests through [employer] or call your pharmacy and they will forward the refill request to us. Please allow 3 business days for your refill to be completed.          Additional Information About Your Visit        [employer] Information     [employer] gives you secure access to your electronic health record. If you see a primary care provider, you can also send messages to your care team and make appointments. If you have questions, please call your primary care clinic.  If you do not have a primary care provider, please call [phone] and they will assist you.        Care EveryWhere ID     This is your Care EveryWhere ID. This could be used by other organizations to access your [facility] medical records  [id number]        Your Vitals Were     Pulse Temperature Pulse Oximetry BMI (Body Mass Index)          89 98.9  F (37.2  C) (Oral) 98% 23.41 kg/m2         Blood Pressure from Last 3 Encounters:   03/08/17 119/79   02/22/17 125/81   02/01/17 134/84    Weight from Last 3 Encounters:   03/08/17 136 lb 6.4 oz (61.9 kg)   02/22/17 140 lb (63.5 kg)   02/01/17 135 lb (61.2 kg)              We Performed the Following     BASIC METABOLIC PANEL     Lipid panel reflex to direct LDL          Today's Medication Changes          These changes are accurate as of: 3/8/17  7:48 AM.  If you have any questions, ask your nurse or doctor.               These medicines have changed or have updated prescriptions.        Dose/Directions    omeprazole 20 MG CR capsule   Commonly known as:  priLOSEC   This may have changed:  additional instructions   Used for:  Gastroesophageal reflux disease without esophagitis        Dose:  20 mg   Take 1 capsule (20 mg) by mouth daily   Quantity:  90  capsule   Refills:  3                Primary Care Provider Office Phone # Fax #    Bessie Aponte [phone]       [facility] [address] [facility] [address]        Thank you!     Thank you for choosing [facility]  for your care. Our goal is always to provide you with excellent care. Hearing back from our patients is one way we can continue to improve our services. Please take a few minutes to complete the written survey that you may receive in the mail after your visit with us. Thank you!             Your Updated Medication List - Protect others around you: Learn how to safely use, store and throw away your medicines at www.disposemymeds.org.          This list is accurate as of: 3/8/17  7:48 AM.  Always use your most recent med list.                   Brand Name Dispense Instructions for use    ASPIRIN NOT PRESCRIBED    INTENTIONAL    0 each    1 each continuous prn Antiplatelet medication not prescribed intentionally due to not needed.       calcium carbonate 500 MG chewable tablet    TUMS     Take 2 chew tab by mouth 2 times daily       ferrous sulfate 325 (65 FE) MG tablet    IRON     Take by mouth daily (with breakfast)       hydrochlorothiazide 25 MG tablet    HYDRODIURIL    90 tablet    Take 1 tablet (25 mg) by mouth daily       lisinopril 20 MG tablet    PRINIVIL/ZESTRIL    90 tablet    Take 1 tablet (20 mg) by mouth daily       MELATONIN PO          omeprazole 20 MG CR capsule    priLOSEC    90 capsule    Take 1 capsule (20 mg) by mouth daily       tamoxifen 20 MG tablet    NOLVADEX     20 mg

## 2017-03-16 ENCOUNTER — TRANSFERRED RECORDS (OUTPATIENT)
Dept: HEALTH INFORMATION MANAGEMENT | Facility: CLINIC | Age: 57
End: 2017-03-16

## 2017-03-28 ENCOUNTER — TELEPHONE (OUTPATIENT)
Dept: OBGYN | Facility: CLINIC | Age: 57
End: 2017-03-28

## 2017-03-28 NOTE — TELEPHONE ENCOUNTER
I called patient and I left a message I called.   Since she had a voice mail that identified her, I left the results.   I also suggest that if the bleeding continues, then we might need to consider another alternative.   Piotr Barbour MD

## 2017-03-29 ENCOUNTER — OFFICE VISIT (OUTPATIENT)
Dept: OBGYN | Facility: CLINIC | Age: 57
End: 2017-03-29
Payer: COMMERCIAL

## 2017-03-29 VITALS
BODY MASS INDEX: 23.17 KG/M2 | DIASTOLIC BLOOD PRESSURE: 80 MMHG | HEART RATE: 89 BPM | SYSTOLIC BLOOD PRESSURE: 136 MMHG | WEIGHT: 135 LBS | RESPIRATION RATE: 16 BRPM

## 2017-03-29 DIAGNOSIS — Z85.3 PERSONAL HISTORY OF MALIGNANT NEOPLASM OF BREAST: ICD-10-CM

## 2017-03-29 DIAGNOSIS — N95.0 POSTMENOPAUSAL BLEEDING: Primary | ICD-10-CM

## 2017-03-29 PROCEDURE — 99213 OFFICE O/P EST LOW 20 MIN: CPT | Performed by: OBSTETRICS & GYNECOLOGY

## 2017-03-29 NOTE — NURSING NOTE
"Chief Complaint   Patient presents with     Surgical Followup     Post-op D&C Hysteroscopy done 3/16/17       Initial /80 (BP Location: Right arm, Patient Position: Chair, Cuff Size: Adult Regular)  Pulse 89  Resp 16  Wt 135 lb (61.2 kg)  Breastfeeding? No  BMI 23.17 kg/m2 Estimated body mass index is 23.17 kg/(m^2) as calculated from the following:    Height as of 2/1/17: 5' 4\" (1.626 m).    Weight as of this encounter: 135 lb (61.2 kg).  Medication Reconciliation: complete   Elvi Souza CMA      "

## 2017-03-29 NOTE — MR AVS SNAPSHOT
After Visit Summary   3/29/2017    Nathalie Melchor    MRN: [id number]           Patient Information     Date Of Birth          1960        Visit Information        Provider Department      3/29/2017 3:45 PM Piotr Barbour MD [facility]        Today's Diagnoses     Postmenopausal bleeding    -  1    Personal history of malignant neoplasm of breast          Care Instructions    If you have any questions regarding your visit, Please contact your care team.    Women s Health  TELEPHONE NUMBER   FACUNDO Brand-    Sara Ayala-Medical Assistant      [facility]  [address] [address] [place], [place] [address].  [place], [place] [address] [phone] ask for [facility]    Imaging [phone]    [facility] Labor and Delivery  [address] [place] [address] Dr.  [place], [place] [address] [phone]    [facility]  [address] [facility]  [place] [address] [phone] ask for [facility]  Imaging [phone]     Urgent Care locations:    [facility] Monday-Friday  5 pm - 9 pm  Saturday and Sunday   9 am - 5 pm    Monday-Friday   11 am - 9 pm  Saturday and Sunday   9 am - 5 pm   [phone]       If you need a medication refill, please contact your pharmacy. Please allow 3 business days for your refill to be completed.  As always, Thank you for trusting us with your healthcare needs!          Follow-ups after your visit        Who to contact     If you have questions or need follow up information about today's clinic visit or your schedule please contact [facility] directly at [phone].  Normal or non-critical lab and imaging results will be communicated to you by MyChart, letter or phone within 4 business days after the clinic has received the results. If you do not hear from us within 7 days, please contact the clinic through MyChart or phone. If you have a  critical or abnormal lab result, we will notify you by phone as soon as possible.  Submit refill requests through [employer] or call your pharmacy and they will forward the refill request to us. Please allow 3 business days for your refill to be completed.          Additional Information About Your Visit        [employer] Information     [employer] gives you secure access to your electronic health record. If you see a primary care provider, you can also send messages to your care team and make appointments. If you have questions, please call your primary care clinic.  If you do not have a primary care provider, please call [phone] and they will assist you.        Care EveryWhere ID     This is your Care EveryWhere ID. This could be used by other organizations to access your [facility] medical records  [id number]        Your Vitals Were     Pulse Respirations Breastfeeding? BMI (Body Mass Index)          89 16 No 23.17 kg/m2         Blood Pressure from Last 3 Encounters:   03/29/17 136/80   03/08/17 119/79   02/22/17 125/81    Weight from Last 3 Encounters:   03/29/17 135 lb (61.2 kg)   03/08/17 136 lb 6.4 oz (61.9 kg)   02/22/17 140 lb (63.5 kg)              Today, you had the following     No orders found for display         Today's Medication Changes          These changes are accurate as of: 3/29/17 11:59 PM.  If you have any questions, ask your nurse or doctor.               These medicines have changed or have updated prescriptions.        Dose/Directions    omeprazole 20 MG CR capsule   Commonly known as:  priLOSEC   This may have changed:  additional instructions   Used for:  Gastroesophageal reflux disease without esophagitis        Dose:  20 mg   Take 1 capsule (20 mg) by mouth daily   Quantity:  90 capsule   Refills:  3                Primary Care Provider Office Phone # Fax #    Bessie Aponte [phone]       [facility] [address] [facility] [address]        Thank  you!     Thank you for choosing [facility]  for your care. Our goal is always to provide you with excellent care. Hearing back from our patients is one way we can continue to improve our services. Please take a few minutes to complete the written survey that you may receive in the mail after your visit with us. Thank you!             Your Updated Medication List - Protect others around you: Learn how to safely use, store and throw away your medicines at www.disposemymeds.org.          This list is accurate as of: 3/29/17 11:59 PM.  Always use your most recent med list.                   Brand Name Dispense Instructions for use    ASPIRIN NOT PRESCRIBED    INTENTIONAL    0 each    1 each continuous prn Antiplatelet medication not prescribed intentionally due to not needed.       calcium carbonate 500 MG chewable tablet    TUMS     Take 2 chew tab by mouth 2 times daily       ferrous sulfate 325 (65 FE) MG tablet    IRON     Take by mouth daily (with breakfast)       hydrochlorothiazide 25 MG tablet    HYDRODIURIL    90 tablet    Take 1 tablet (25 mg) by mouth daily       lisinopril 20 MG tablet    PRINIVIL/ZESTRIL    90 tablet    Take 1 tablet (20 mg) by mouth daily       MELATONIN PO          omeprazole 20 MG CR capsule    priLOSEC    90 capsule    Take 1 capsule (20 mg) by mouth daily       tamoxifen 20 MG tablet    NOLVADEX     20 mg

## 2017-03-29 NOTE — PATIENT INSTRUCTIONS
If you have any questions regarding your visit, Please contact your care team.    Women s Health  TELEPHONE NUMBER   FACUNDO Brand-    Sara Ayala-Medical Assistant      [facility]  [address] [address] [place], [place] [address] [place] [address]  [phone] ask for [facility]    Imaging [phone]    [facility] Labor and Delivery  [address] [place] [address]  [phone]    [facility]  [address] [facility] [place] [address] [phone] ask for [facility]  Imaging [phone]     Urgent Care locations:    [facility] Monday-Friday  5 pm - 9 pm  Saturday and Sunday   9 am - 5 pm    Monday-Friday   11 am - 9 pm  Saturday and Sunday   9 am - 5 pm   [phone]       If you need a medication refill, please contact your pharmacy. Please allow 3 business days for your refill to be completed.  As always, Thank you for trusting us with your healthcare needs!

## 2017-04-04 NOTE — PROGRESS NOTES
"Nathalie is a 56 year old postmenopausal female, .  She presents today for follow up on  The hysteroscopy and the D&C.  She currently is not having any problems and she has had some spotting since the procedure.  She currently is having no problems with pain.  No fever, chills, abnormal bleeding.   I reviewed the surgical findings and the pathology results with her and she voiced her understanding.    The following pathology result is reviewed with her.    Case Report   Surgical Pathology                                Case: [id number]                                    Authorizing Provider:  Piotr Barbour MD       Collected:           2017 03:42 PM           Ordering Location:     Intra-Op                   Received:            2017 04:58 PM           Pathologist:           Abdullahi Faria MD                                                      Specimens:   A) - Endocervical, ENDOCERVICAL CURRETTINGS                                                          B) - Endometrium, ENDOMETRIUM CURRETTINGS                                                  Final Diagnosis   A. Endocervical, currettings- Negative     B. Endometrium, currettings- Predominantly blood, scant superficial fragments of glandular epithelium with metaplastic change as well as breakdown. See comment.   Electronically signed by Abdullahi Faria MD on 3/22/2017 at 1525   Comment       Given the clinical history and an endometrial biopsy showing predominantly blood with only scant superficial glandular epithelium. Clinical correlation for adequacy of endometrial sampling is recommended, with repeat sampling if post menopausal bleeding or thickened endometrial stripe persists for further pathologic evaluation.   Clinical Information Pre-op diagnosis:   POST MENOPAUSAL BLEEDING AND THICKENED ENDOMETRIUM.     Per EPIC hysteroscopy note \"Difficult to visualize the endometrium due to blood in the cavity\"      Gross Description     "   A: Endocervical curettings: Multiple tan-pink soft tissue fragments admixed with mucus, 0.4 g and 0.6 x 0.5 x 0.1 cm in aggregate. Fragment are filtered and submitted. IT-1.     B: Endometrial curettings: Multiple tan-pink soft tissue from is admixed with hemorrhagic clot, 1 g and 3.5 x 2 x 0.3 cm aggregate. The fragments are filtered and submitted. IT-1.  (JR)       The patient's medical history, social history and family history have been reviewed and there are no updates at this time.      ROS:  All negative, except as noted in the HPI and in the PMH.    PE:   /80 (BP Location: Right arm, Patient Position: Chair, Cuff Size: Adult Regular)  Pulse 89  Resp 16  Wt 135 lb (61.2 kg)  Breastfeeding? No  BMI 23.17 kg/m2  General:  WNWD female, NAD  Alert  Oriented x 3  Gait:  Normal  Skin:  Normal skin turgor  HEENT:  NC/AT, EOMI  Lungs:  Good respiratory effort   Abdomen:  Non-tender, non-distended.  Pelvic exam:  Not performed   Extremities:  No clubbing, no cyanosis and no edema.      Assessment:  Postmenopausal bleeding  Personal history of breast cancer.       Plan:  The limitations of the procedure were reviewed with her.  Due to the bleeding, it was difficult to visualize the endometrium.  The pathology results are reviewed and although limited, give some reassurance.    Should the bleeding continue worsen, then she might consider further evaluation or therapy, possibly the hysterectomy.  She will continue to monitor at this time.   Questions seemed to be answered.       Piotr Barbour MD

## 2017-04-30 ENCOUNTER — OFFICE VISIT (OUTPATIENT)
Dept: URGENT CARE | Facility: URGENT CARE | Age: 57
End: 2017-04-30
Payer: COMMERCIAL

## 2017-04-30 VITALS
SYSTOLIC BLOOD PRESSURE: 115 MMHG | HEART RATE: 103 BPM | WEIGHT: 135 LBS | BODY MASS INDEX: 23.17 KG/M2 | OXYGEN SATURATION: 98 % | TEMPERATURE: 99.9 F | DIASTOLIC BLOOD PRESSURE: 78 MMHG

## 2017-04-30 DIAGNOSIS — J30.1 SEASONAL ALLERGIC RHINITIS DUE TO POLLEN: ICD-10-CM

## 2017-04-30 DIAGNOSIS — J01.90 ACUTE NON-RECURRENT SINUSITIS, UNSPECIFIED LOCATION: Primary | ICD-10-CM

## 2017-04-30 PROCEDURE — 99213 OFFICE O/P EST LOW 20 MIN: CPT | Performed by: FAMILY MEDICINE

## 2017-04-30 RX ORDER — PREDNISONE 20 MG/1
20 TABLET ORAL 2 TIMES DAILY
Qty: 14 TABLET | Refills: 0 | Status: SHIPPED | OUTPATIENT
Start: 2017-04-30 | End: 2017-05-07

## 2017-04-30 RX ORDER — AMOXICILLIN 875 MG
875 TABLET ORAL 2 TIMES DAILY
Qty: 14 TABLET | Refills: 0 | Status: SHIPPED | OUTPATIENT
Start: 2017-04-30 | End: 2017-05-07

## 2017-04-30 NOTE — PROGRESS NOTES
SUBJECTIVE:  Here for worsening sinus symptoms over past 4 days.  ++ season allergies - trying to keep in check with benadryl.  But pressure getting worse.  Now tired, run down, and fever to 100.  Thick green NC.  No cough or wheeze    Review of systems otherwise negative.  Past medical, family, and social history reviewed and updated in chart.    OBJECTIVE:  /78  Pulse 103  Temp 99.9  F (37.7  C) (Oral)  Wt 135 lb (61.2 kg)  SpO2 98%  BMI 23.17 kg/m2  Alert, pleasant, upbeat, and in no apparent discomfort.  Ears normal. Throat and pharynx normal. Neck supple. No adenopathy or masses in the neck or supraclavicular regions. Sinuses non tender.  S1 and S2 normal, no murmurs, clicks, gallops or rubs. Regular rate and rhythm. Chest is clear; no wheezes or rales. No edema or JVD.  The abdomen is soft without tenderness, guarding, mass, rebound or organomegaly. Bowel sounds are normal. No CVA tenderness or inguinal adenopathy noted.  Past labs reviewed with the patient.     ASSESSMENT / PLAN:  (J01.90) Acute non-recurrent sinusitis, unspecified location  (primary encounter diagnosis)  Comment: Discussed mechanism of action of the proposed medication, as well as potential effects, both good and bad.  Patient expressed understanding and agreed with treatment.   Plan: amoxicillin (AMOXIL) 875 MG tablet, predniSONE         (DELTASONE) 20 MG tablet            (J30.1) Seasonal allergic rhinitis due to pollen  Comment: as above   Plan: predniSONE (DELTASONE) 20 MG tablet            Follow up as needed   SNimco Villa MD    (Chart documentation completed in part with Dragon voice-recognition software.  Even though reviewed some grammatical, spelling, and word errors may remain.)

## 2017-04-30 NOTE — NURSING NOTE
"Chief Complaint   Patient presents with     Cough     4 days       Initial /78  Pulse 103  Temp 99.9  F (37.7  C) (Oral)  Wt 135 lb (61.2 kg)  SpO2 98%  BMI 23.17 kg/m2 Estimated body mass index is 23.17 kg/(m^2) as calculated from the following:    Height as of 2/1/17: 5' 4\" (1.626 m).    Weight as of this encounter: 135 lb (61.2 kg)..  BP completed using cuff size: manuel Dubois MA      "

## 2017-04-30 NOTE — MR AVS SNAPSHOT
After Visit Summary   4/30/2017    Nathalie Melchor    MRN: [id number]           Patient Information     Date Of Birth          1960        Visit Information        Provider Department      4/30/2017 10:35 AM Kathie Villa MD [facility]        Today's Diagnoses     Acute non-recurrent sinusitis, unspecified location    -  1    Seasonal allergic rhinitis due to pollen           Follow-ups after your visit        Follow-up notes from your care team     Return if symptoms worsen or fail to improve.      Who to contact     If you have questions or need follow up information about today's clinic visit or your schedule please contact [facility] directly at [phone].  Normal or non-critical lab and imaging results will be communicated to you by MyChart, letter or phone within 4 business days after the clinic has received the results. If you do not hear from us within 7 days, please contact the clinic through Food52[employer] or phone. If you have a critical or abnormal lab result, we will notify you by phone as soon as possible.  Submit refill requests through BitCom[employer] or call your pharmacy and they will forward the refill request to us. Please allow 3 business days for your refill to be completed.          Additional Information About Your Visit        MyChart Information     [employer] gives you secure access to your electronic health record. If you see a primary care provider, you can also send messages to your care team and make appointments. If you have questions, please call your primary care clinic.  If you do not have a primary care provider, please call [phone] and they will assist you.        Care EveryWhere ID     This is your Care EveryWhere ID. This could be used by other organizations to access your [place] medical records  [id number]        Your Vitals Were     Pulse Temperature Pulse Oximetry BMI (Body Mass Index)          103 99.9  F (37.7  C) (Oral)  98% 23.17 kg/m2         Blood Pressure from Last 3 Encounters:   04/30/17 115/78   03/29/17 136/80   03/08/17 119/79    Weight from Last 3 Encounters:   04/30/17 135 lb (61.2 kg)   03/29/17 135 lb (61.2 kg)   03/08/17 136 lb 6.4 oz (61.9 kg)              Today, you had the following     No orders found for display         Today's Medication Changes          These changes are accurate as of: 4/30/17 11:17 AM.  If you have any questions, ask your nurse or doctor.               Start taking these medicines.        Dose/Directions    amoxicillin 875 MG tablet   Commonly known as:  AMOXIL   Used for:  Acute non-recurrent sinusitis, unspecified location        Dose:  875 mg   Take 1 tablet (875 mg) by mouth 2 times daily for 7 days   Quantity:  14 tablet   Refills:  0       predniSONE 20 MG tablet   Commonly known as:  DELTASONE   Used for:  Acute non-recurrent sinusitis, unspecified location, Seasonal allergic rhinitis due to pollen        Dose:  20 mg   Take 1 tablet (20 mg) by mouth 2 times daily for 7 days   Quantity:  14 tablet   Refills:  0         These medicines have changed or have updated prescriptions.        Dose/Directions    omeprazole 20 MG CR capsule   Commonly known as:  priLOSEC   This may have changed:  additional instructions   Used for:  Gastroesophageal reflux disease without esophagitis        Dose:  20 mg   Take 1 capsule (20 mg) by mouth daily   Quantity:  90 capsule   Refills:  3            Where to get your medicines      These medications were sent to [facility] [address] [place], [place] - [address] [facility]  [address] [facility] [address]     Phone:  [phone]     amoxicillin 875 MG tablet    predniSONE 20 MG tablet                Primary Care Provider Office Phone # Fax #    Bessie Aponte -[phone]       [facility] [address] [facility] [address]        Thank you!     Thank you for choosing [facility]  for your  care. Our goal is always to provide you with excellent care. Hearing back from our patients is one way we can continue to improve our services. Please take a few minutes to complete the written survey that you may receive in the mail after your visit with us. Thank you!             Your Updated Medication List - Protect others around you: Learn how to safely use, store and throw away your medicines at www.disposemymeds.org.          This list is accurate as of: 4/30/17 11:17 AM.  Always use your most recent med list.                   Brand Name Dispense Instructions for use    amoxicillin 875 MG tablet    AMOXIL    14 tablet    Take 1 tablet (875 mg) by mouth 2 times daily for 7 days       ASPIRIN NOT PRESCRIBED    INTENTIONAL    0 each    1 each continuous prn Antiplatelet medication not prescribed intentionally due to not needed.       calcium carbonate 500 MG chewable tablet    TUMS     Take 2 chew tab by mouth 2 times daily       ferrous sulfate 325 (65 FE) MG tablet    IRON     Take by mouth daily (with breakfast)       hydrochlorothiazide 25 MG tablet    HYDRODIURIL    90 tablet    Take 1 tablet (25 mg) by mouth daily       lisinopril 20 MG tablet    PRINIVIL/ZESTRIL    90 tablet    Take 1 tablet (20 mg) by mouth daily       MELATONIN PO          omeprazole 20 MG CR capsule    priLOSEC    90 capsule    Take 1 capsule (20 mg) by mouth daily       predniSONE 20 MG tablet    DELTASONE    14 tablet    Take 1 tablet (20 mg) by mouth 2 times daily for 7 days       tamoxifen 20 MG tablet    NOLVADEX     20 mg

## 2017-05-29 ENCOUNTER — E-VISIT (OUTPATIENT)
Dept: FAMILY MEDICINE | Facility: CLINIC | Age: 57
End: 2017-05-29
Payer: COMMERCIAL

## 2017-05-29 DIAGNOSIS — J30.2 SEASONAL ALLERGIC RHINITIS, UNSPECIFIED ALLERGIC RHINITIS TRIGGER: ICD-10-CM

## 2017-05-29 DIAGNOSIS — F40.240 CLAUSTROPHOBIA: Primary | ICD-10-CM

## 2017-05-29 PROCEDURE — 99444 ZZC PHYSICIAN ONLINE EVALUATION & MANAGEMENT SERVICE: CPT | Performed by: FAMILY MEDICINE

## 2017-05-29 NOTE — MR AVS SNAPSHOT
After Visit Summary   5/29/2017    Nathalie Melchor    MRN: [id number]           Patient Information     Date Of Birth          1960        Visit Information        Provider Department      5/29/2017 6:47 PM Bessie Aponte MD [facility]        Today's Diagnoses     Claustrophobia    -  1    Seasonal allergic rhinitis, unspecified allergic rhinitis trigger           Follow-ups after your visit        Who to contact     If you have questions or need follow up information about today's clinic visit or your schedule please contact [facility] directly at [phone].  Normal or non-critical lab and imaging results will be communicated to you by [employer] Technologieshart, letter or phone within 4 business days after the clinic has received the results. If you do not hear from us within 7 days, please contact the clinic through [employer] or phone. If you have a critical or abnormal lab result, we will notify you by phone as soon as possible.  Submit refill requests through [employer] or call your pharmacy and they will forward the refill request to us. Please allow 3 business days for your refill to be completed.          Additional Information About Your Visit        MyChart Information     [employer] gives you secure access to your electronic health record. If you see a primary care provider, you can also send messages to your care team and make appointments. If you have questions, please call your primary care clinic.  If you do not have a primary care provider, please call [phone] and they will assist you.        Care EveryWhere ID     This is your Care EveryWhere ID. This could be used by other organizations to access your [place] medical records  [id number]         Blood Pressure from Last 3 Encounters:   04/30/17 115/78   03/29/17 136/80   03/08/17 119/79    Weight from Last 3 Encounters:   04/30/17 135 lb (61.2 kg)   03/29/17 135 lb (61.2 kg)   03/08/17 136 lb 6.4 oz (61.9 kg)               Today, you had the following     No orders found for display         Today's Medication Changes          These changes are accurate as of: 5/29/17 11:59 PM.  If you have any questions, ask your nurse or doctor.               Start taking these medicines.        Dose/Directions    ALPRAZolam 0.5 MG tablet   Commonly known as:  XANAX   Used for:  Claustrophobia        1 tablet 30 minutes prior to boarding the plane, may repeat dose if still too nervous prior to take off.   Quantity:  10 tablet   Refills:  0       fluticasone 50 MCG/ACT spray   Commonly known as:  FLONASE   Used for:  Seasonal allergic rhinitis, unspecified allergic rhinitis trigger        Dose:  1-2 spray   Spray 1-2 sprays into both nostrils daily   Quantity:  1 Bottle   Refills:  11         These medicines have changed or have updated prescriptions.        Dose/Directions    omeprazole 20 MG CR capsule   Commonly known as:  priLOSEC   This may have changed:  additional instructions   Used for:  Gastroesophageal reflux disease without esophagitis        Dose:  20 mg   Take 1 capsule (20 mg) by mouth daily   Quantity:  90 capsule   Refills:  3            Where to get your medicines      These medications were sent to [facility] [address] [place], [place] - [address] [facility]  [address] [address]     Phone:  [phone]     fluticasone 50 MCG/ACT spray         Some of these will need a paper prescription and others can be bought over the counter.  Ask your nurse if you have questions.     Bring a paper prescription for each of these medications     ALPRAZolam 0.5 MG tablet                Primary Care Provider Office Phone # Fax #    Bessie Aponte [phone]       [facility] [address] [facility] [address]        Thank you!     Thank you for choosing [facility]  for your care. Our goal is always to provide you with excellent care. Hearing back from our  patients is one way we can continue to improve our services. Please take a few minutes to complete the written survey that you may receive in the mail after your visit with us. Thank you!             Your Updated Medication List - Protect others around you: Learn how to safely use, store and throw away your medicines at www.disposemymeds.org.          This list is accurate as of: 5/29/17 11:59 PM.  Always use your most recent med list.                   Brand Name Dispense Instructions for use    ALPRAZolam 0.5 MG tablet    XANAX    10 tablet    1 tablet 30 minutes prior to boarding the plane, may repeat dose if still too nervous prior to take off.       ASPIRIN NOT PRESCRIBED    INTENTIONAL    0 each    1 each continuous prn Antiplatelet medication not prescribed intentionally due to not needed.       calcium carbonate 500 MG chewable tablet    TUMS     Take 2 chew tab by mouth 2 times daily       ferrous sulfate 325 (65 FE) MG tablet    IRON     Take by mouth daily (with breakfast)       fluticasone 50 MCG/ACT spray    FLONASE    1 Bottle    Spray 1-2 sprays into both nostrils daily       hydrochlorothiazide 25 MG tablet    HYDRODIURIL    90 tablet    Take 1 tablet (25 mg) by mouth daily       lisinopril 20 MG tablet    PRINIVIL/ZESTRIL    90 tablet    Take 1 tablet (20 mg) by mouth daily       MELATONIN PO          omeprazole 20 MG CR capsule    priLOSEC    90 capsule    Take 1 capsule (20 mg) by mouth daily       tamoxifen 20 MG tablet    NOLVADEX     20 mg

## 2017-05-30 RX ORDER — FLUTICASONE PROPIONATE 50 MCG
1-2 SPRAY, SUSPENSION (ML) NASAL DAILY
Qty: 1 BOTTLE | Refills: 11 | Status: SHIPPED | OUTPATIENT
Start: 2017-05-30

## 2017-05-30 RX ORDER — ALPRAZOLAM 0.5 MG
TABLET ORAL
Qty: 10 TABLET | Refills: 0 | Status: SHIPPED | OUTPATIENT
Start: 2017-05-30

## 2017-07-12 ENCOUNTER — TRANSFERRED RECORDS (OUTPATIENT)
Dept: HEALTH INFORMATION MANAGEMENT | Facility: CLINIC | Age: 57
End: 2017-07-12

## 2017-08-22 ENCOUNTER — OFFICE VISIT (OUTPATIENT)
Dept: OPTOMETRY | Facility: CLINIC | Age: 57
End: 2017-08-22
Payer: COMMERCIAL

## 2017-08-22 DIAGNOSIS — H52.13 MYOPIA OF BOTH EYES: Primary | ICD-10-CM

## 2017-08-22 DIAGNOSIS — H52.4 PRESBYOPIA: ICD-10-CM

## 2017-08-22 DIAGNOSIS — D31.32 CHOROIDAL NEVUS OF LEFT EYE: ICD-10-CM

## 2017-08-22 PROCEDURE — 92015 DETERMINE REFRACTIVE STATE: CPT | Performed by: OPTOMETRIST

## 2017-08-22 PROCEDURE — 92014 COMPRE OPH EXAM EST PT 1/>: CPT | Performed by: OPTOMETRIST

## 2017-08-22 ASSESSMENT — CUP TO DISC RATIO
OS_RATIO: 0.1
OD_RATIO: 0.1

## 2017-08-22 ASSESSMENT — REFRACTION_WEARINGRX
OD_AXIS: 010
OS_SPHERE: -5.25
OS_CYLINDER: SPHERE
OD_ADD: +2.00
OD_SPHERE: -5.50
OD_SPHERE: -5.50
SPECS_TYPE: PAL
SPECS_TYPE: PAL
OS_ADD: +2.00
OS_CYLINDER: SPHERE
OD_CYLINDER: +1.00
OS_SPHERE: -5.25
OD_CYLINDER: +1.00
OS_ADD: +2.00
OD_AXIS: 005
OD_ADD: +2.00

## 2017-08-22 ASSESSMENT — REFRACTION_MANIFEST
OD_ADD: +2.50
OS_CYLINDER: SPHERE
OS_ADD: +2.50
OS_AXIS: 154
OD_SPHERE: -5.75
OD_AXIS: 005
OS_CYLINDER: +0.25
OD_SPHERE: -5.50
OS_SPHERE: -5.75
OS_SPHERE: -5.50
OD_AXIS: 010
METHOD_AUTOREFRACTION: 1
OD_CYLINDER: +1.00
OD_CYLINDER: +1.00

## 2017-08-22 ASSESSMENT — VISUAL ACUITY
OS_CC: 20/20
CORRECTION_TYPE: GLASSES
OD_CC: 20/25
OS_CC: 20/20
METHOD: SNELLEN - LINEAR
OD_CC: 20/20

## 2017-08-22 ASSESSMENT — EXTERNAL EXAM - LEFT EYE: OS_EXAM: NORMAL

## 2017-08-22 ASSESSMENT — CONF VISUAL FIELD
OS_NORMAL: 1
OD_NORMAL: 1

## 2017-08-22 ASSESSMENT — EXTERNAL EXAM - RIGHT EYE: OD_EXAM: NORMAL

## 2017-08-22 ASSESSMENT — TONOMETRY
IOP_METHOD: APPLANATION
OD_IOP_MMHG: 19
OS_IOP_MMHG: 17

## 2017-08-22 ASSESSMENT — SLIT LAMP EXAM - LIDS
COMMENTS: NORMAL
COMMENTS: NORMAL

## 2017-08-22 NOTE — PATIENT INSTRUCTIONS
Patient was advised of today's exam findings.  Fill glasses prescription  Use over the counter artificial tears 2 times a day as needed (**Systane Ultra or Refresh Optive)  Return in 1 year for eye exam    Nona Coy O.D.  [facility]   [address] [place], [place] [id number] [phone]

## 2017-08-22 NOTE — MR AVS SNAPSHOT
After Visit Summary   8/22/2017    Nathalie Melchor    MRN: [id number]           Patient Information     Date Of Birth          1960        Visit Information        Provider Department      8/22/2017 1:00 PM Nona Coy, OD [facility]        Today's Diagnoses     Myopia of both eyes    -  1    Presbyopia        Choroidal nevus of left eye          Care Instructions    Patient was advised of today's exam findings.  Fill glasses prescription  Use over the counter artificial tears 2 times a day as needed (**Systane Ultra or Refresh Optive)  Return in 1 year for eye exam    Nona Coy O.D.  [facility]   [address] [place] [address]  [phone]            Follow-ups after your visit        Who to contact     If you have questions or need follow up information about today's clinic visit or your schedule please contact [facility] directly at [phone].  Normal or non-critical lab and imaging results will be communicated to you by MyChart, letter or phone within 4 business days after the clinic has received the results. If you do not hear from us within 7 days, please contact the clinic through Needish[employer] or phone. If you have a critical or abnormal lab result, we will notify you by phone as soon as possible.  Submit refill requests through RIVS or call your pharmacy and they will forward the refill request to us. Please allow 3 business days for your refill to be completed.          Additional Information About Your Visit        MyChart Information     RIVS gives you secure access to your electronic health record. If you see a primary care provider, you can also send messages to your care team and make appointments. If you have questions, please call your primary care clinic.  If you do not have a primary care provider, please call [phone] and they will assist you.        Care EveryWhere ID     This is your Care EveryWhere ID. This  could be used by other organizations to access your [place] medical records  [id number]         Blood Pressure from Last 3 Encounters:   04/30/17 115/78   03/29/17 136/80   03/08/17 119/79    Weight from Last 3 Encounters:   04/30/17 61.2 kg (135 lb)   03/29/17 61.2 kg (135 lb)   03/08/17 61.9 kg (136 lb 6.4 oz)              Today, you had the following     No orders found for display         Today's Medication Changes          These changes are accurate as of: 8/22/17  2:31 PM.  If you have any questions, ask your nurse or doctor.               These medicines have changed or have updated prescriptions.        Dose/Directions    omeprazole 20 MG CR capsule   Commonly known as:  priLOSEC   This may have changed:  additional instructions   Used for:  Gastroesophageal reflux disease without esophagitis        Dose:  20 mg   Take 1 capsule (20 mg) by mouth daily   Quantity:  90 capsule   Refills:  3                Primary Care Provider Office Phone # Fax #    Bessie Aponte [phone] [address] [facility] [address]        Equal Access to Services     [facility] : Hadii francois rodriguez hadasho Sogiselle, waaxda luqadaha, qaybta kaalmanedra wright, arie cervantes. So [facility] [phone].    ATENCIÓN: Si habla español, tiene a sage disposición servicios gratuitos de asistencia lingüística. Osman al [phone].    We comply with applicable federal civil rights laws and [place] laws. We do not discriminate on the basis of race, color, national origin, age, disability sex, sexual orientation or gender identity.            Thank you!     Thank you for choosing [facility]  for your care. Our goal is always to provide you with excellent care. Hearing back from our patients is one way we can continue to improve our services. Please take a few minutes to complete the written survey that you may receive in the mail after your visit with us. Thank you!              Your Updated Medication List - Protect others around you: Learn how to safely use, store and throw away your medicines at www.disposemymeds.org.          This list is accurate as of: 8/22/17  2:31 PM.  Always use your most recent med list.                   Brand Name Dispense Instructions for use Diagnosis    ALPRAZolam 0.5 MG tablet    XANAX    10 tablet    1 tablet 30 minutes prior to boarding the plane, may repeat dose if still too nervous prior to take off.    Claustrophobia       ASPIRIN NOT PRESCRIBED    INTENTIONAL    0 each    1 each continuous prn Antiplatelet medication not prescribed intentionally due to not needed.    Hypertension goal BP (blood pressure) < 140/90       calcium carbonate 500 MG chewable tablet    TUMS     Take 2 chew tab by mouth 2 times daily        ferrous sulfate 325 (65 FE) MG tablet    IRON     Take by mouth daily (with breakfast)        fluticasone 50 MCG/ACT spray    FLONASE    1 Bottle    Spray 1-2 sprays into both nostrils daily    Seasonal allergic rhinitis, unspecified allergic rhinitis trigger       hydrochlorothiazide 25 MG tablet    HYDRODIURIL    90 tablet    Take 1 tablet (25 mg) by mouth daily    Hypertension goal BP (blood pressure) < 140/90       lisinopril 20 MG tablet    PRINIVIL/ZESTRIL    90 tablet    Take 1 tablet (20 mg) by mouth daily    Hypertension goal BP (blood pressure) < 140/90       MELATONIN PO           omeprazole 20 MG CR capsule    priLOSEC    90 capsule    Take 1 capsule (20 mg) by mouth daily    Gastroesophageal reflux disease without esophagitis       tamoxifen 20 MG tablet    NOLVADEX     20 mg

## 2017-08-22 NOTE — PROGRESS NOTES
"Chief Complaint   Patient presents with     COMPREHENSIVE EYE EXAM         Last Eye Exam: 5/6/2015  Dilated Previously: Yes    What are you currently using to see?  Glasses, wears the glasses all of the time        Distance Vision Acuity: Satisfied with vision, no changes to vision. Mentioned that the glasses have never been real \"sharp\"     Near Vision Acuity: Satisfied with vision while reading  with glasses and unaided and Satisfied with vision while using computer with glasses. Looks under her glasses or lifts the glasses for very fine needle work     Eye Comfort: good and dry at times. , told incomplete blink in the past   Eyes feel tired at times as well   Do you use eye drops? : Yes: Recently bought lubricating drops, has used them a few times, knows that she has dry eyes- dislikes using drops in eyes  Occupation or Hobbies: Teacher     Caryn Jimenez Optometric Assistant           Medical, surgical and family histories reviewed and updated 8/22/2017.       OBJECTIVE: See Ophthalmology exam    ASSESSMENT:    ICD-10-CM    1. Myopia of both eyes H52.13 EYE EXAM (SIMPLE-NONBILLABLE)     REFRACTION   2. Presbyopia H52.4 EYE EXAM (SIMPLE-NONBILLABLE)     REFRACTION   3. Choroidal nevus of left eye D31.32 EYE EXAM (SIMPLE-NONBILLABLE)     REFRACTION      PLAN:     Patient Instructions   Patient was advised of today's exam findings.  Fill glasses prescription  Use over the counter artificial tears 2 times a day as needed (**Systane Ultra or Refresh Optive)  Return in 1 year for eye exam    Nona Coy O.D.  [facility]   [id number] [place] [address] [id number] [phone]       "

## 2017-11-04 DIAGNOSIS — I10 HYPERTENSION GOAL BP (BLOOD PRESSURE) < 140/90: ICD-10-CM

## 2017-11-07 RX ORDER — HYDROCHLOROTHIAZIDE 25 MG/1
TABLET ORAL
Qty: 90 TABLET | Refills: 0 | Status: SHIPPED | OUTPATIENT
Start: 2017-11-07 | End: 2018-02-02

## 2017-11-07 RX ORDER — LISINOPRIL 20 MG/1
TABLET ORAL
Qty: 90 TABLET | Refills: 0 | Status: SHIPPED | OUTPATIENT
Start: 2017-11-07 | End: 2018-02-02

## 2018-02-08 ENCOUNTER — E-VISIT (OUTPATIENT)
Dept: FAMILY MEDICINE | Facility: CLINIC | Age: 58
End: 2018-02-08
Payer: COMMERCIAL

## 2018-02-08 DIAGNOSIS — J01.90 ACUTE SINUSITIS WITH SYMPTOMS > 10 DAYS: Primary | ICD-10-CM

## 2018-02-08 PROCEDURE — 99444 ZZC PHYSICIAN ONLINE EVALUATION & MANAGEMENT SERVICE: CPT | Performed by: FAMILY MEDICINE

## 2018-02-09 RX ORDER — AZITHROMYCIN 250 MG/1
TABLET, FILM COATED ORAL
Qty: 6 TABLET | Refills: 0 | Status: SHIPPED | OUTPATIENT
Start: 2018-02-09 | End: 2018-03-22

## 2018-03-05 DIAGNOSIS — I10 HYPERTENSION GOAL BP (BLOOD PRESSURE) < 140/90: ICD-10-CM

## 2018-03-06 RX ORDER — HYDROCHLOROTHIAZIDE 25 MG/1
25 TABLET ORAL DAILY
Qty: 30 TABLET | Refills: 0 | Status: SHIPPED | OUTPATIENT
Start: 2018-03-06 | End: 2018-03-30

## 2018-03-06 RX ORDER — LISINOPRIL 20 MG/1
20 TABLET ORAL DAILY
Qty: 30 TABLET | Refills: 0 | Status: SHIPPED | OUTPATIENT
Start: 2018-03-06 | End: 2018-03-30

## 2018-03-06 NOTE — TELEPHONE ENCOUNTER
Medication is being filled for 1 time refill only due to:  Patient needs to be seen because due for apppointment.  .   Need to keep upcoming appointment for further refills  Jesenia Goldstein RN

## 2018-03-19 ENCOUNTER — DOCUMENTATION ONLY (OUTPATIENT)
Dept: LAB | Facility: CLINIC | Age: 58
End: 2018-03-19

## 2018-03-19 DIAGNOSIS — Z13.6 CARDIOVASCULAR SCREENING; LDL GOAL LESS THAN 160: ICD-10-CM

## 2018-03-19 DIAGNOSIS — I10 HYPERTENSION GOAL BP (BLOOD PRESSURE) < 140/90: Primary | ICD-10-CM

## 2018-03-19 NOTE — PROGRESS NOTES
Patient has an upcoming previsit appointment on 3/22/2018. Please review pended orders and add additional orders if needed.     Thank you,   Shelby Quintanilla

## 2018-03-19 NOTE — PROGRESS NOTES
Please review lab orders sign and close encounter. Maria E Arevalo MA/MILENA    BP/med check appt 3/30/18

## 2018-03-22 DIAGNOSIS — I10 HYPERTENSION GOAL BP (BLOOD PRESSURE) < 140/90: ICD-10-CM

## 2018-03-22 DIAGNOSIS — Z13.6 CARDIOVASCULAR SCREENING; LDL GOAL LESS THAN 160: ICD-10-CM

## 2018-03-22 LAB
ANION GAP SERPL CALCULATED.3IONS-SCNC: 9 MMOL/L (ref 3–14)
BUN SERPL-MCNC: 18 MG/DL (ref 7–30)
CALCIUM SERPL-MCNC: 8.7 MG/DL (ref 8.5–10.1)
CHLORIDE SERPL-SCNC: 103 MMOL/L (ref 94–109)
CHOLEST SERPL-MCNC: 198 MG/DL
CO2 SERPL-SCNC: 27 MMOL/L (ref 20–32)
CREAT SERPL-MCNC: 0.59 MG/DL (ref 0.52–1.04)
GFR SERPL CREATININE-BSD FRML MDRD: >90 ML/MIN/1.7M2
GLUCOSE SERPL-MCNC: 102 MG/DL (ref 70–99)
HDLC SERPL-MCNC: 37 MG/DL
LDLC SERPL CALC-MCNC: 119 MG/DL
NONHDLC SERPL-MCNC: 161 MG/DL
POTASSIUM SERPL-SCNC: 3.6 MMOL/L (ref 3.4–5.3)
SODIUM SERPL-SCNC: 139 MMOL/L (ref 133–144)
TRIGL SERPL-MCNC: 210 MG/DL

## 2018-03-22 PROCEDURE — 80061 LIPID PANEL: CPT | Performed by: FAMILY MEDICINE

## 2018-03-22 PROCEDURE — 80048 BASIC METABOLIC PNL TOTAL CA: CPT | Performed by: FAMILY MEDICINE

## 2018-03-22 PROCEDURE — 36415 COLL VENOUS BLD VENIPUNCTURE: CPT | Performed by: FAMILY MEDICINE

## 2018-03-29 NOTE — PROGRESS NOTES
SUBJECTIVE:   Nathalie Melchor is a 57 year old female who presents to clinic today for the following health issues:      Hypertension Follow-up      Outpatient blood pressures are not being checked.    Low Salt Diet: not monitoring salt    Hypertension ROS: taking medications as instructed, no medication side effects noted, no TIA's, no chest pain on exertion, no dyspnea on exertion, no swelling of ankles.  No headache or visual changes.       Amount of exercise or physical activity: 1 day/week for an average of 30-45 minutes    Problems taking medications regularly: No    Medication side effects: none    Diet: regular (no restrictions)      Having anal irritation/occas blood in underwear.    Bumps in vaginal area about 1 year, occas itch, no pain.      Problem list and histories reviewed & adjusted, as indicated.  Additional history: as documented    Patient Active Problem List   Diagnosis     Iron deficiency anemia     Leiomyoma of uterus     CARDIOVASCULAR SCREENING; LDL GOAL LESS THAN 160     Osteopenia     Hypertension goal BP (blood pressure) < 140/90     Health maintenance examination     Choroidal nevus of left eye     Gastroesophageal reflux disease without esophagitis     Advanced directives, counseling/discussion     Malignant neoplasm of lower-inner quadrant of left female breast (H)     High risk medication use     Past Surgical History:   Procedure Laterality Date     CYSTOSCOPY  04/08/1993    cystoscopy and urethral dilation     DILATION AND CURETTAGE, HYSTEROSCOPY DIAGNOSTIC, COMBINED N/A 03/16/2017     ENDOSCOPY  04/03/2007    upper GI endoscopy-[facility]     MASTECTOMY SIMPLE BILATERAL  7/2009    Armando Massectomy, breast cancer       Social History   Substance Use Topics     Smoking status: Never Smoker     Smokeless tobacco: Never Used      Comment: lives in smoke free household.     Alcohol use Yes      Comment: 1 or 2 a month     Family History   Problem Relation Age of Onset      Hypertension Mother      CANCER Mother      not melanoma     OSTEOPOROSIS Mother      Eye Surgery Mother      cataracts     Allergies Father      HEART DISEASE Father 78     MI     Hypertension Father      Eye Surgery Father      cataracts     Cancer - colorectal Maternal Grandfather 90     DIABETES No family hx of      CEREBROVASCULAR DISEASE No family hx of      Thyroid Disease No family hx of      Glaucoma No family hx of      Macular Degeneration No family hx of          Current Outpatient Prescriptions   Medication Sig Dispense Refill     OMEPRAZOLE PO Take 20 mg by mouth daily as needed       lisinopril (PRINIVIL/ZESTRIL) 20 MG tablet Take 1 tablet (20 mg) by mouth daily 90 tablet 1     hydrochlorothiazide (HYDRODIURIL) 25 MG tablet Take 1 tablet (25 mg) by mouth daily Need to keep upcoming appointment for further refills 90 tablet 1     [DISCONTINUED] lisinopril (PRINIVIL/ZESTRIL) 20 MG tablet Take 1 tablet (20 mg) by mouth daily Need to keep upcoming appointment for further refills 30 tablet 0     [DISCONTINUED] hydrochlorothiazide (HYDRODIURIL) 25 MG tablet Take 1 tablet (25 mg) by mouth daily Need to keep upcoming appointment for further refills 30 tablet 0     ALPRAZolam (XANAX) 0.5 MG tablet 1 tablet 30 minutes prior to boarding the plane, may repeat dose if still too nervous prior to take off. 10 tablet 0     fluticasone (FLONASE) 50 MCG/ACT spray Spray 1-2 sprays into both nostrils daily 1 Bottle 11     tamoxifen (NOLVADEX) 20 MG tablet 20 mg       calcium carbonate (TUMS) 500 MG chewable tablet Take 2 chew tab by mouth 2 times daily       MELATONIN PO Take by mouth nightly as needed        ASPIRIN NOT PRESCRIBED, INTENTIONAL, 1 each continuous prn Antiplatelet medication not prescribed intentionally due to not needed. 0 each 0     ferrous sulfate 325 (65 FE) MG tablet Take by mouth daily (with breakfast)       BP Readings from Last 3 Encounters:   03/30/18 138/83   04/30/17 115/78   03/29/17  "136/80    Wt Readings from Last 3 Encounters:   03/30/18 140 lb (63.5 kg)   04/30/17 135 lb (61.2 kg)   03/29/17 135 lb (61.2 kg)                  Labs reviewed in EPIC    Reviewed and updated as needed this visit by clinical staff  Tobacco  Allergies  Meds  Problems  Fam Hx  Soc Hx      Reviewed and updated as needed this visit by Provider  Allergies  Meds  Problems         ROS:  Constitutional, HEENT, cardiovascular, pulmonary, gi and gu systems are negative, except as otherwise noted.    OBJECTIVE:     /83  Pulse 86  Temp 98.6  F (37  C) (Oral)  Resp 20  Ht 5' 4\" (1.626 m)  Wt 140 lb (63.5 kg)  SpO2 96%  BMI 24.03 kg/m2  Body mass index is 24.03 kg/(m^2).  GENERAL: healthy, alert and no distress  EYES: Eyes grossly normal to inspection, PERRL and conjunctivae and sclerae normal  NECK: no adenopathy, no asymmetry, masses, or scars and thyroid normal to palpation  RESP: lungs clear to auscultation - no rales, rhonchi or wheezes  CV: regular rate and rhythm, normal S1 S2, no S3 or S4, no murmur, click or rub, no peripheral edema and peripheral pulses strong   (female): normal female external genitalia, normal urethral meatus, vaginal mucosa, normal milia in area of pt concern  RECTAL (female): anal fissure at 6:00 position in lithotomy position. Small hemorrhoidal skin tag at 2:00 position    Diagnostic Test Results:  none     ASSESSMENT/PLAN:     (I10) Hypertension goal BP (blood pressure) < 140/90  (primary encounter diagnosis)  Comment: to goal  Plan: lisinopril (PRINIVIL/ZESTRIL) 20 MG tablet,         hydrochlorothiazide (HYDRODIURIL) 25 MG tablet         Refill x 6 months f/u 6 months for OV and labs - AFE    (K60.2) Anal fissure  Comment: mild  Plan: reviewed preventive measures.    Reassurance regarding vag/vulvar milia  See Patient Instructions    Bessie Aponte MD  [facility]    "

## 2018-03-30 ENCOUNTER — OFFICE VISIT (OUTPATIENT)
Dept: FAMILY MEDICINE | Facility: CLINIC | Age: 58
End: 2018-03-30
Payer: COMMERCIAL

## 2018-03-30 VITALS
HEIGHT: 64 IN | HEART RATE: 86 BPM | DIASTOLIC BLOOD PRESSURE: 83 MMHG | BODY MASS INDEX: 23.9 KG/M2 | OXYGEN SATURATION: 96 % | SYSTOLIC BLOOD PRESSURE: 138 MMHG | WEIGHT: 140 LBS | TEMPERATURE: 98.6 F | RESPIRATION RATE: 20 BRPM

## 2018-03-30 DIAGNOSIS — K60.2 ANAL FISSURE: ICD-10-CM

## 2018-03-30 DIAGNOSIS — I10 HYPERTENSION GOAL BP (BLOOD PRESSURE) < 140/90: Primary | ICD-10-CM

## 2018-03-30 PROCEDURE — 99214 OFFICE O/P EST MOD 30 MIN: CPT | Performed by: FAMILY MEDICINE

## 2018-03-30 RX ORDER — LISINOPRIL 20 MG/1
20 TABLET ORAL DAILY
Qty: 90 TABLET | Refills: 1 | Status: SHIPPED | OUTPATIENT
Start: 2018-03-30 | End: 2018-10-13

## 2018-03-30 RX ORDER — HYDROCHLOROTHIAZIDE 25 MG/1
25 TABLET ORAL DAILY
Qty: 90 TABLET | Refills: 1 | Status: SHIPPED | OUTPATIENT
Start: 2018-03-30 | End: 2018-10-13

## 2018-03-30 NOTE — MR AVS SNAPSHOT
After Visit Summary   3/30/2018    Nathalie Melchor    MRN: [id number]           Patient Information     Date Of Birth          1960        Visit Information        Provider Department      3/30/2018 8:35 AM Bessie Aponte MD [facility]        Today's Diagnoses     Hypertension goal BP (blood pressure) < 140/90    -  1    Anal fissure           Follow-ups after your visit        Follow-up notes from your care team     Return in about 6 months (around 9/30/2018) for Physical Exam, Lab Work.      Who to contact     If you have questions or need follow up information about today's clinic visit or your schedule please contact [facility] directly at [phone].  Normal or non-critical lab and imaging results will be communicated to you by MyChart, letter or phone within 4 business days after the clinic has received the results. If you do not hear from us within 7 days, please contact the clinic through [email or web address] or phone. If you have a critical or abnormal lab result, we will notify you by phone as soon as possible.  Submit refill requests through [employer] or call your pharmacy and they will forward the refill request to us. Please allow 3 business days for your refill to be completed.          Additional Information About Your Visit        MyChart Information     [employer] gives you secure access to your electronic health record. If you see a primary care provider, you can also send messages to your care team and make appointments. If you have questions, please call your primary care clinic.  If you do not have a primary care provider, please call [phone] and they will assist you.        Care EveryWhere ID     This is your Care EveryWhere ID. This could be used by other organizations to access your [facility] medical records  [id number]        Your Vitals Were     Pulse Temperature Respirations Height Pulse Oximetry BMI (Body Mass Index)    86 98.6  F (37  C)  "(Oral) 20 5' 4\" (1.626 m) 96% 24.03 kg/m2       Blood Pressure from Last 3 Encounters:   03/30/18 138/83   04/30/17 115/78   03/29/17 136/80    Weight from Last 3 Encounters:   03/30/18 140 lb (63.5 kg)   04/30/17 135 lb (61.2 kg)   03/29/17 135 lb (61.2 kg)              Today, you had the following     No orders found for display         Today's Medication Changes          These changes are accurate as of 3/30/18 10:09 AM.  If you have any questions, ask your nurse or doctor.               These medicines have changed or have updated prescriptions.        Dose/Directions    lisinopril 20 MG tablet   Commonly known as:  PRINIVIL/ZESTRIL   This may have changed:  additional instructions   Used for:  Hypertension goal BP (blood pressure) < 140/90   Changed by:  Bessie Aponte MD        Dose:  20 mg   Take 1 tablet (20 mg) by mouth daily   Quantity:  90 tablet   Refills:  1            Where to get your medicines      These medications were sent to [facility] [address] [place], [place] - [address] [facility]  [address] [facility] [address]     Phone:  [phone]     hydrochlorothiazide 25 MG tablet    lisinopril 20 MG tablet                Primary Care Provider Office Phone # Fax #    Bessie Aponte [phone] [address] [facility] [address]        Equal Access to Services     [facility]: Hadii francois rodriguez hadasho Sokonstantinali, waaxda luqadaha, qaybta kaalmada adeegyada, arie cowan adeglenny cervantes. So [facility] [phone].    ATENCIÓN: Si habla español, tiene a sage disposición servicios gratuitos de asistencia lingüística. Llame al [phone].    We comply with applicable federal civil rights laws and [place] laws. We do not discriminate on the basis of race, color, national origin, age, disability, sex, sexual orientation, or gender identity.            Thank you!     Thank you for choosing [facility]  for your care. Our goal is always to " provide you with excellent care. Hearing back from our patients is one way we can continue to improve our services. Please take a few minutes to complete the written survey that you may receive in the mail after your visit with us. Thank you!             Your Updated Medication List - Protect others around you: Learn how to safely use, store and throw away your medicines at www.disposemymeds.org.          This list is accurate as of 3/30/18 10:09 AM.  Always use your most recent med list.                   Brand Name Dispense Instructions for use Diagnosis    ALPRAZolam 0.5 MG tablet    XANAX    10 tablet    1 tablet 30 minutes prior to boarding the plane, may repeat dose if still too nervous prior to take off.    Claustrophobia       ASPIRIN NOT PRESCRIBED    INTENTIONAL    0 each    1 each continuous prn Antiplatelet medication not prescribed intentionally due to not needed.    Hypertension goal BP (blood pressure) < 140/90       calcium carbonate 500 MG chewable tablet    TUMS     Take 2 chew tab by mouth 2 times daily        ferrous sulfate 325 (65 FE) MG tablet    IRON     Take by mouth daily (with breakfast)        fluticasone 50 MCG/ACT spray    FLONASE    1 Bottle    Spray 1-2 sprays into both nostrils daily    Seasonal allergic rhinitis, unspecified allergic rhinitis trigger       hydrochlorothiazide 25 MG tablet    HYDRODIURIL    90 tablet    Take 1 tablet (25 mg) by mouth daily Need to keep upcoming appointment for further refills    Hypertension goal BP (blood pressure) < 140/90       lisinopril 20 MG tablet    PRINIVIL/ZESTRIL    90 tablet    Take 1 tablet (20 mg) by mouth daily    Hypertension goal BP (blood pressure) < 140/90       MELATONIN PO      Take by mouth nightly as needed        OMEPRAZOLE PO      Take 20 mg by mouth daily as needed        tamoxifen 20 MG tablet    NOLVADEX     20 mg

## 2018-03-30 NOTE — NURSING NOTE
"Chief Complaint   Patient presents with     Hypertension     Health Maintenance       Initial /83  Pulse 86  Temp 98.6  F (37  C) (Oral)  Resp 20  Ht 5' 4\" (1.626 m)  Wt 140 lb (63.5 kg)  SpO2 96%  BMI 24.03 kg/m2 Estimated body mass index is 24.03 kg/(m^2) as calculated from the following:    Height as of this encounter: 5' 4\" (1.626 m).    Weight as of this encounter: 140 lb (63.5 kg).    Ree Martinez, CMA    "

## 2018-04-03 ENCOUNTER — TRANSFERRED RECORDS (OUTPATIENT)
Dept: HEALTH INFORMATION MANAGEMENT | Facility: CLINIC | Age: 58
End: 2018-04-03

## 2018-08-01 ENCOUNTER — MYC MEDICAL ADVICE (OUTPATIENT)
Dept: FAMILY MEDICINE | Facility: CLINIC | Age: 58
End: 2018-08-01

## 2018-08-22 ENCOUNTER — MYC MEDICAL ADVICE (OUTPATIENT)
Dept: FAMILY MEDICINE | Facility: CLINIC | Age: 58
End: 2018-08-22

## 2018-08-22 NOTE — TELEPHONE ENCOUNTER
Per protocol, will route encounter to be cosigned by provider for Verbal Orders.  Jesenia Goldstein RN

## 2018-08-29 ENCOUNTER — ALLIED HEALTH/NURSE VISIT (OUTPATIENT)
Dept: NURSING | Facility: CLINIC | Age: 58
End: 2018-08-29
Payer: COMMERCIAL

## 2018-08-29 ENCOUNTER — OFFICE VISIT (OUTPATIENT)
Dept: OPTOMETRY | Facility: CLINIC | Age: 58
End: 2018-08-29
Payer: COMMERCIAL

## 2018-08-29 DIAGNOSIS — Z23 NEED FOR PROPHYLACTIC VACCINATION AND INOCULATION AGAINST INFLUENZA: ICD-10-CM

## 2018-08-29 DIAGNOSIS — D31.32 CHOROIDAL NEVUS OF LEFT EYE: ICD-10-CM

## 2018-08-29 DIAGNOSIS — H52.11 MYOPIA, RIGHT: Primary | ICD-10-CM

## 2018-08-29 DIAGNOSIS — Z23 NEED FOR VACCINATION: ICD-10-CM

## 2018-08-29 DIAGNOSIS — Z83.518 FAMILY HISTORY OF MACULAR DEGENERATION: ICD-10-CM

## 2018-08-29 DIAGNOSIS — H52.4 PRESBYOPIA: ICD-10-CM

## 2018-08-29 PROCEDURE — 90715 TDAP VACCINE 7 YRS/> IM: CPT

## 2018-08-29 PROCEDURE — 90682 RIV4 VACC RECOMBINANT DNA IM: CPT

## 2018-08-29 PROCEDURE — 92015 DETERMINE REFRACTIVE STATE: CPT | Performed by: OPTOMETRIST

## 2018-08-29 PROCEDURE — 90471 IMMUNIZATION ADMIN: CPT

## 2018-08-29 PROCEDURE — 90472 IMMUNIZATION ADMIN EACH ADD: CPT

## 2018-08-29 PROCEDURE — 99207 ZZC NO CHARGE NURSE ONLY: CPT

## 2018-08-29 PROCEDURE — 92014 COMPRE OPH EXAM EST PT 1/>: CPT | Performed by: OPTOMETRIST

## 2018-08-29 ASSESSMENT — VISUAL ACUITY
OD_CC: 20/30
METHOD: SNELLEN - LINEAR
CORRECTION_TYPE: GLASSES
OS_CC: 20/25-1
OD_CC: 20/20
OS_CC: 20/20

## 2018-08-29 ASSESSMENT — CONF VISUAL FIELD
OS_NORMAL: 1
OD_NORMAL: 1
METHOD: COUNTING FINGERS

## 2018-08-29 ASSESSMENT — KERATOMETRY
OS_K1POWER_DIOPTERS: 45.75
OD_AXISANGLE2_DEGREES: 174
OD_K1POWER_DIOPTERS: 46.50
OS_K2POWER_DIOPTERS: 46.25
OS_AXISANGLE2_DEGREES: 9
OD_K2POWER_DIOPTERS: 46.25

## 2018-08-29 ASSESSMENT — REFRACTION_MANIFEST
OS_CYLINDER: SPHERE
OS_AXIS: 145
OD_SPHERE: -5.25
METHOD_AUTOREFRACTION: 1
OD_SPHERE: -5.50
OD_CYLINDER: +0.75
OS_CYLINDER: +0.50
OS_SPHERE: -5.50
OD_CYLINDER: +0.75
OS_ADD: +2.50
OD_ADD: +2.50
OS_SPHERE: -5.25
OD_AXIS: 011
OD_AXIS: 015

## 2018-08-29 ASSESSMENT — REFRACTION_WEARINGRX
OD_CYLINDER: +1.00
OS_AXIS: 045
OD_SPHERE: -5.75
SPECS_TYPE: PAL
OS_SPHERE: -5.75
OD_AXIS: 015
OD_CYLINDER: +1.00
OD_ADD: +2.00
OS_CYLINDER: +0.25
OD_SPHERE: -5.50
SPECS_TYPE: PAL
OS_SPHERE: -5.25
OS_CYLINDER: SPHERE
OD_AXIS: 005
OS_ADD: +2.00
OD_ADD: +2.50
OS_ADD: +2.50

## 2018-08-29 ASSESSMENT — CUP TO DISC RATIO
OS_RATIO: 0.1
OD_RATIO: 0.1

## 2018-08-29 ASSESSMENT — TONOMETRY
OS_IOP_MMHG: 19
IOP_METHOD: APPLANATION
OD_IOP_MMHG: 20

## 2018-08-29 ASSESSMENT — EXTERNAL EXAM - LEFT EYE: OS_EXAM: NORMAL

## 2018-08-29 ASSESSMENT — SLIT LAMP EXAM - LIDS
COMMENTS: NORMAL
COMMENTS: NORMAL

## 2018-08-29 ASSESSMENT — EXTERNAL EXAM - RIGHT EYE: OD_EXAM: NORMAL

## 2018-08-29 NOTE — MR AVS SNAPSHOT
After Visit Summary   8/29/2018    Nathalie Melchor    MRN: [id number]           Patient Information     Date Of Birth          1960        Visit Information        Provider Department      8/29/2018 1:20 PM Nona Coy OD [facility]        Today's Diagnoses     Myopia, right    -  1    Presbyopia          Care Instructions    Patient was advised of today's exam findings.  Fill glasses prescription  Allow 2 weeks to adapt to change in glasses  Return in 1 year for eye exam    Nona Coy O.D.  [facility]   [address] [place] [address]  [phone]            Follow-ups after your visit        Your next 10 appointments already scheduled     Aug 29, 2018  2:30 PM CDT   Nurse Only with AN ANCILLARY   [facility] ([facility])    [address] [facility] [address] [phone]              Who to contact     If you have questions or need follow up information about today's clinic visit or your schedule please contact [facility] directly at [phone].  Normal or non-critical lab and imaging results will be communicated to you by IPXIhart, letter or phone within 4 business days after the clinic has received the results. If you do not hear from us within 7 days, please contact the clinic through [employer] or phone. If you have a critical or abnormal lab result, we will notify you by phone as soon as possible.  Submit refill requests through [employer] or call your pharmacy and they will forward the refill request to us. Please allow 3 business days for your refill to be completed.          Additional Information About Your Visit        MyChart Information     [employer] gives you secure access to your electronic health record. If you see a primary care provider, you can also send messages to your care team and make appointments. If you have questions, please call your primary care clinic.  If you do not have a  primary care provider, please call [phone] and they will assist you.        Care EveryWhere ID     This is your Care EveryWhere ID. This could be used by other organizations to access your [facility] medical records  [id number]         Blood Pressure from Last 3 Encounters:   03/30/18 138/83   04/30/17 115/78   03/29/17 136/80    Weight from Last 3 Encounters:   03/30/18 63.5 kg (140 lb)   04/30/17 61.2 kg (135 lb)   03/29/17 61.2 kg (135 lb)              Today, you had the following     No orders found for display       Primary Care Provider Office Phone # Fax #    Bessie Anika Aponte [phone] [address] [facility] [address]        Equal Access to Services     SANJU CASTELLON : Hadii francois rodriguez hadasho Sogiselle, waaxda luqadaha, qaybta kaalmada adeegyada, arie seth haydaron martínez . So [facility] [phone].    ATENCIÓN: Si habla español, tiene a sage disposición servicios gratuitos de asistencia lingüística. Aracelyame al [phone].    We comply with applicable federal civil rights laws and [place] laws. We do not discriminate on the basis of race, color, national origin, age, disability, sex, sexual orientation, or gender identity.            Thank you!     Thank you for choosing [facility]  for your care. Our goal is always to provide you with excellent care. Hearing back from our patients is one way we can continue to improve our services. Please take a few minutes to complete the written survey that you may receive in the mail after your visit with us. Thank you!             Your Updated Medication List - Protect others around you: Learn how to safely use, store and throw away your medicines at www.disposemymeds.org.          This list is accurate as of 8/29/18  2:25 PM.  Always use your most recent med list.                   Brand Name Dispense Instructions for use Diagnosis    ALPRAZolam 0.5 MG tablet    XANAX    10 tablet    1 tablet 30 minutes prior  to boarding the plane, may repeat dose if still too nervous prior to take off.    Claustrophobia       ASPIRIN NOT PRESCRIBED    INTENTIONAL    0 each    1 each continuous prn Antiplatelet medication not prescribed intentionally due to not needed.    Hypertension goal BP (blood pressure) < 140/90       calcium carbonate 500 MG chewable tablet    TUMS     Take 2 chew tab by mouth 2 times daily        ferrous sulfate 325 (65 Fe) MG tablet    IRON     Take by mouth daily (with breakfast)        fluticasone 50 MCG/ACT spray    FLONASE    1 Bottle    Spray 1-2 sprays into both nostrils daily    Seasonal allergic rhinitis, unspecified allergic rhinitis trigger       hydrochlorothiazide 25 MG tablet    HYDRODIURIL    90 tablet    Take 1 tablet (25 mg) by mouth daily Need to keep upcoming appointment for further refills    Hypertension goal BP (blood pressure) < 140/90       lisinopril 20 MG tablet    PRINIVIL/ZESTRIL    90 tablet    Take 1 tablet (20 mg) by mouth daily    Hypertension goal BP (blood pressure) < 140/90       MELATONIN PO      Take by mouth nightly as needed        OMEPRAZOLE PO      Take 20 mg by mouth daily as needed        tamoxifen 20 MG tablet    NOLVADEX     20 mg

## 2018-08-29 NOTE — MR AVS SNAPSHOT
After Visit Summary   8/29/2018    Nathalie Melchor    MRN: [id number]           Patient Information     Date Of Birth          1960        Visit Information        Provider Department      8/29/2018 2:30 PM AN ANCILLARY [facility]        Today's Diagnoses     Need for vaccination        Need for prophylactic vaccination and inoculation against influenza           Follow-ups after your visit        Who to contact     If you have questions or need follow up information about today's clinic visit or your schedule please contact [facility] directly at [phone].  Normal or non-critical lab and imaging results will be communicated to you by Airpoweredhart, letter or phone within 4 business days after the clinic has received the results. If you do not hear from us within 7 days, please contact the clinic through Streemiot or phone. If you have a critical or abnormal lab result, we will notify you by phone as soon as possible.  Submit refill requests through City Chattr or call your pharmacy and they will forward the refill request to us. Please allow 3 business days for your refill to be completed.          Additional Information About Your Visit        MyChart Information     [employer]tr gives you secure access to your electronic health record. If you see a primary care provider, you can also send messages to your care team and make appointments. If you have questions, please call your primary care clinic.  If you do not have a primary care provider, please call [phone] and they will assist you.        Care EveryWhere ID     This is your Care EveryWhere ID. This could be used by other organizations to access your [place] medical records  [id number]         Blood Pressure from Last 3 Encounters:   03/30/18 138/83   04/30/17 115/78   03/29/17 136/80    Weight from Last 3 Encounters:   03/30/18 140 lb (63.5 kg)   04/30/17 135 lb (61.2 kg)   03/29/17 135 lb (61.2 kg)              We  Performed the Following     1st  Administration  [25947]     FLU VACCINE, (RIV4) RECOMBINANT HA  , IM (FluBlok, egg free) [21991]- >18 YRS ([facility] recommended  50-64 YRS)     TDAP VACCINE (ADACEL) [19588.002]     Vaccine Administration, Each Additional [34354]        Primary Care Provider Office Phone # Fax #    Bessie Anika Aponte [phone] [address] [facility] [address]        Equal Access to Services     SANJU CASTELLON : Hadii aad ku hadasho Soomaali, waaxda luqadaha, qaybta kaalmada adeegyada, waxay idiin hayaan adeeg kharanina martínez . So [facility] [phone].    ATENCIÓN: Si habla español, tiene a sage disposición servicios gratuitos de asistencia lingüística. [facility] [phone].    We comply with applicable federal civil rights laws and [place] laws. We do not discriminate on the basis of race, color, national origin, age, disability, sex, sexual orientation, or gender identity.            Thank you!     Thank you for choosing [facility]  for your care. Our goal is always to provide you with excellent care. Hearing back from our patients is one way we can continue to improve our services. Please take a few minutes to complete the written survey that you may receive in the mail after your visit with us. Thank you!             Your Updated Medication List - Protect others around you: Learn how to safely use, store and throw away your medicines at www.disposemymeds.org.          This list is accurate as of 8/29/18  2:48 PM.  Always use your most recent med list.                   Brand Name Dispense Instructions for use Diagnosis    ALPRAZolam 0.5 MG tablet    XANAX    10 tablet    1 tablet 30 minutes prior to boarding the plane, may repeat dose if still too nervous prior to take off.    Claustrophobia       ASPIRIN NOT PRESCRIBED    INTENTIONAL    0 each    1 each continuous prn Antiplatelet medication not prescribed intentionally due to not needed.    Hypertension goal  BP (blood pressure) < 140/90       calcium carbonate 500 MG chewable tablet    TUMS     Take 2 chew tab by mouth 2 times daily        ferrous sulfate 325 (65 Fe) MG tablet    IRON     Take by mouth daily (with breakfast)        fluticasone 50 MCG/ACT spray    FLONASE    1 Bottle    Spray 1-2 sprays into both nostrils daily    Seasonal allergic rhinitis, unspecified allergic rhinitis trigger       hydrochlorothiazide 25 MG tablet    HYDRODIURIL    90 tablet    Take 1 tablet (25 mg) by mouth daily Need to keep upcoming appointment for further refills    Hypertension goal BP (blood pressure) < 140/90       lisinopril 20 MG tablet    PRINIVIL/ZESTRIL    90 tablet    Take 1 tablet (20 mg) by mouth daily    Hypertension goal BP (blood pressure) < 140/90       MELATONIN PO      Take by mouth nightly as needed        OMEPRAZOLE PO      Take 20 mg by mouth daily as needed        tamoxifen 20 MG tablet    NOLVADEX     20 mg

## 2018-08-29 NOTE — PROGRESS NOTES
Chief Complaint   Patient presents with     COMPREHENSIVE EYE EXAM     yearly      Accompanied by     Last Eye Exam: 8/22/2017  Dilated Previously: Yes    What are you currently using to see?  Glasses, wears glasses all of the time        Distance Vision Acuity: Satisfied with vision, no changes     Near Vision Acuity: Satisfied with vision while reading  with glasses, does take the glasses off at times for very small needle work. Thinks that it is taking a longer period of time to adjust these days as well     Eye Comfort: good, she said that she has been told that they are dry  Do you use eye drops? : No  Occupation or Hobbies: Retired     [employer] Assistant           Medical, surgical and family histories reviewed and updated 8/29/2018.     caffeine allergy causes vertigo symptoms, has August allergies usually but did not take allergy meds yet- recently single episode upon waking opened eyes in bed and vision was jerky -symptoms  just as if took caffeine, has not happened since then, started allergy meds that day        OBJECTIVE: See Ophthalmology exam    ASSESSMENT:    ICD-10-CM    1. Myopia, right H52.11 EYE EXAM (SIMPLE-NONBILLABLE)     REFRACTION   2. Presbyopia H52.4 EYE EXAM (SIMPLE-NONBILLABLE)     REFRACTION   3. 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 Family history of macular degeneration- mother  Z83.518 EYE EXAM (SIMPLE-NONBILLABLE)     REFRACTION      PLAN:   Discussed presbyopia and change in facility of focus    Patient Instructions   Patient was advised of today's exam findings.  Fill glasses prescription  Allow 2 weeks to adapt to change in glasses  Monitor yearly due to nevus and family history macular degeneration   Return in 1 year for eye exam    Nona Coy O.D.  [facility]   [address] [place], [place] [id number] [phone]

## 2018-08-29 NOTE — PROGRESS NOTES
Injectable Influenza Immunization Documentation    1.  Is the person to be vaccinated sick today?   No    2. Does the person to be vaccinated have an allergy to a component   of the vaccine?   No  Egg Allergy Algorithm Link    3. Has the person to be vaccinated ever had a serious reaction   to influenza vaccine in the past?   No    4. Has the person to be vaccinated ever had Guillain-Barré syndrome?   No    Form completed by Juani Couch MA         Screening Questionnaire for Adult Immunization    Are you sick today?   No   Do you have allergies to medications, food, a vaccine component or latex?   No   Have you ever had a serious reaction after receiving a vaccination?   No   Do you have a long-term health problem with heart disease, lung disease, asthma, kidney disease, metabolic disease (e.g. diabetes), anemia, or other blood disorder?   No   Do you have cancer, leukemia, HIV/AIDS, or any other immune system problem?   No   In the past 3 months, have you taken medications that affect  your immune system, such as prednisone, other steroids, or anticancer drugs; drugs for the treatment of rheumatoid arthritis, Crohn s disease, or psoriasis; or have you had radiation treatments?   No   Have you had a seizure, or a brain or other nervous system problem?   No   During the past year, have you received a transfusion of blood or blood     products, or been given immune (gamma) globulin or antiviral drug?   No   For women: Are you pregnant or is there a chance you could become        pregnant during the next month?   No   Have you received any vaccinations in the past 4 weeks?   No     Immunization questionnaire answers were all negative.        Per orders of Dr. Bessie Aponte, injection of Tdap given by Juani Couch. Patient instructed to remain in clinic for 15 minutes afterwards, and to report any adverse reaction to me immediately.       Screening performed by Juani Couch on 8/29/2018 at 2:47  PM.

## 2018-08-29 NOTE — LETTER
8/29/2018         RE: Nathalie Melchor  [address] [facility] [address]        Dear Colleague,    Thank you for referring your patient, Nathalie Melchor, to the [facility]. Please see a copy of my visit note below.    Chief Complaint   Patient presents with     COMPREHENSIVE EYE EXAM     yearly      Accompanied by     Last Eye Exam: 8/22/2017  Dilated Previously: Yes    What are you currently using to see?  Glasses, wears glasses all of the time        Distance Vision Acuity: Satisfied with vision, no changes     Near Vision Acuity: Satisfied with vision while reading  with glasses, does take the glasses off at times for very small needle work. Thinks that it is taking a longer period of time to adjust these days as well     Eye Comfort: good, she said that she has been told that they are dry  Do you use eye drops? 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     caffeine allergy causes vertigo symptoms, has August allergies usually but did not take allergy meds yet- recently single episode upon waking opened eyes in bed and vision was jerky -symptoms  just as if took caffeine, has not happened since then, started allergy meds that day        OBJECTIVE: See Ophthalmology exam    ASSESSMENT:    ICD-10-CM    1. Myopia, right H52.11 EYE EXAM (SIMPLE-NONBILLABLE)     REFRACTION   2. Presbyopia H52.4 EYE EXAM (SIMPLE-NONBILLABLE)     REFRACTION   3. 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 Family history of macular degeneration- mother  Z83.518 EYE EXAM (SIMPLE-NONBILLABLE)     REFRACTION      PLAN:   Discussed presbyopia and change in facility of focus    Patient Instructions   Patient was advised of today's exam findings.  Fill glasses prescription  Allow 2 weeks to adapt to change in glasses  Monitor yearly due to nevus and family history  macular degeneration   Return in 1 year for eye exam    Nona Coy O.D.  [facility]   [address] [place] [address] [phone]           Again, thank you for allowing me to participate in the care of your patient.        Sincerely,        Nona Coy, OD

## 2018-08-29 NOTE — PATIENT INSTRUCTIONS
Patient was advised of today's exam findings.  Fill glasses prescription  Allow 2 weeks to adapt to change in glasses  Monitor yearly due to nevus and family history macular degeneration   Return in 1 year for eye exam    Nona Coy O.D.  [facility]   [address] [place], [place] [id number] [phone]

## 2018-08-30 PROBLEM — Z83.518 FAMILY HISTORY OF MACULAR DEGENERATION: Status: ACTIVE | Noted: 2018-08-30

## 2018-10-13 DIAGNOSIS — I10 HYPERTENSION GOAL BP (BLOOD PRESSURE) < 140/90: ICD-10-CM

## 2018-10-15 RX ORDER — LISINOPRIL 20 MG/1
TABLET ORAL
Qty: 90 TABLET | Refills: 1 | Status: SHIPPED | OUTPATIENT
Start: 2018-10-15

## 2018-10-15 RX ORDER — HYDROCHLOROTHIAZIDE 25 MG/1
25 TABLET ORAL DAILY
Qty: 90 TABLET | Refills: 1 | Status: SHIPPED | OUTPATIENT
Start: 2018-10-15 | End: 2019-05-02

## 2019-05-02 DIAGNOSIS — I10 HYPERTENSION GOAL BP (BLOOD PRESSURE) < 140/90: ICD-10-CM

## 2019-05-02 NOTE — LETTER
May 6, 2019    Nathalie Melchor  [address] [facility] [address]    Dear Nathalie,       We recently received a refill request for Hydrochlorothiazide.  We have refilled this for a one time 30 day supply only because you are due for a:    Blood pressure office visit and fasting lab appointment      Please schedule this lab appointment 4-5 days prior to the office visit.     Please call at your earliest convenience so that there will not be a delay with your future refills.          Thank you,   Your [facility] Team/tyron  [phone]

## 2019-05-03 RX ORDER — HYDROCHLOROTHIAZIDE 25 MG/1
TABLET ORAL
Qty: 30 TABLET | Refills: 0 | Status: SHIPPED | OUTPATIENT
Start: 2019-05-03 | End: 2019-05-24

## 2019-05-03 NOTE — TELEPHONE ENCOUNTER
Medication is being filled for 1 time refill only due to:  Patient needs to be seen for fasting lab appointment and appointment with the provider for further refills. Hypertension   Ree FLOODN, RN

## 2019-05-24 DIAGNOSIS — I10 HYPERTENSION GOAL BP (BLOOD PRESSURE) < 140/90: ICD-10-CM

## 2019-05-24 NOTE — TELEPHONE ENCOUNTER
Routing refill request to provider for review/approval because:  Ewelina given x1 and patient did not follow up, please advise  Ree Braun BSN, RN

## 2019-05-26 RX ORDER — HYDROCHLOROTHIAZIDE 25 MG/1
25 TABLET ORAL DAILY
Qty: 30 TABLET | Refills: 0 | Status: SHIPPED | OUTPATIENT
Start: 2019-05-26 | End: 2019-05-26

## 2019-06-07 DIAGNOSIS — I10 HYPERTENSION GOAL BP (BLOOD PRESSURE) < 140/90: ICD-10-CM

## 2019-06-07 RX ORDER — HYDROCHLOROTHIAZIDE 25 MG/1
TABLET ORAL
Qty: 30 TABLET | Refills: 0 | OUTPATIENT
Start: 2019-06-07

## 2019-09-29 ENCOUNTER — HEALTH MAINTENANCE LETTER (OUTPATIENT)
Age: 59
End: 2019-09-29

## 2021-01-14 ENCOUNTER — HEALTH MAINTENANCE LETTER (OUTPATIENT)
Age: 61
End: 2021-01-14

## 2021-10-23 ENCOUNTER — HEALTH MAINTENANCE LETTER (OUTPATIENT)
Age: 61
End: 2021-10-23

## 2022-02-12 ENCOUNTER — HEALTH MAINTENANCE LETTER (OUTPATIENT)
Age: 62
End: 2022-02-12

## 2022-10-10 ENCOUNTER — HEALTH MAINTENANCE LETTER (OUTPATIENT)
Age: 62
End: 2022-10-10